# Patient Record
Sex: FEMALE | Race: WHITE | NOT HISPANIC OR LATINO | Employment: FULL TIME | URBAN - METROPOLITAN AREA
[De-identification: names, ages, dates, MRNs, and addresses within clinical notes are randomized per-mention and may not be internally consistent; named-entity substitution may affect disease eponyms.]

---

## 2017-01-09 DIAGNOSIS — G43.719 INTRACTABLE CHRONIC MIGRAINE WITHOUT AURA AND WITHOUT STATUS MIGRAINOSUS: ICD-10-CM

## 2017-01-09 DIAGNOSIS — F43.22 ADJUSTMENT DISORDER WITH ANXIOUS MOOD: Primary | ICD-10-CM

## 2017-01-09 RX ORDER — SUMATRIPTAN 50 MG/1
50 TABLET, FILM COATED ORAL
Qty: 20 TABLET | Refills: 1 | Status: SHIPPED | OUTPATIENT
Start: 2017-01-09 | End: 2017-04-17

## 2017-01-09 RX ORDER — ESCITALOPRAM OXALATE 20 MG/1
20 TABLET ORAL DAILY
Qty: 30 TABLET | Refills: 1 | Status: SHIPPED | OUTPATIENT
Start: 2017-01-09 | End: 2017-01-23

## 2017-01-09 NOTE — TELEPHONE ENCOUNTER
Date of last visit at clinic: 12/7/16    Please complete refill and CLOSE ENCOUNTER.  Closing the encounter signifies the refill is complete.

## 2017-01-16 RX ORDER — SUMATRIPTAN 6 MG/.5ML
6 INJECTION, SOLUTION SUBCUTANEOUS ONCE
Qty: 0.5 ML | Refills: 3 | OUTPATIENT
Start: 2017-01-16 | End: 2017-01-23

## 2017-01-23 DIAGNOSIS — G43.719 INTRACTABLE CHRONIC MIGRAINE WITHOUT AURA AND WITHOUT STATUS MIGRAINOSUS: ICD-10-CM

## 2017-01-23 DIAGNOSIS — F43.22 ADJUSTMENT DISORDER WITH ANXIOUS MOOD: Primary | ICD-10-CM

## 2017-01-23 DIAGNOSIS — G43.009 NONINTRACTABLE MIGRAINE, UNSPECIFIED MIGRAINE TYPE: ICD-10-CM

## 2017-01-23 RX ORDER — SUMATRIPTAN 6 MG/.5ML
6 INJECTION, SOLUTION SUBCUTANEOUS ONCE
Qty: 0.5 ML | Refills: 3 | Status: SHIPPED | OUTPATIENT
Start: 2017-01-23 | End: 2017-01-23

## 2017-01-23 RX ORDER — AMITRIPTYLINE HYDROCHLORIDE 10 MG/1
20 TABLET ORAL AT BEDTIME
Qty: 180 TABLET | Refills: 1 | Status: SHIPPED | OUTPATIENT
Start: 2017-01-23 | End: 2017-06-12

## 2017-01-23 RX ORDER — ESCITALOPRAM OXALATE 20 MG/1
20 TABLET ORAL DAILY
Qty: 90 TABLET | Refills: 1 | Status: SHIPPED | OUTPATIENT
Start: 2017-01-23 | End: 2017-08-09

## 2017-04-17 DIAGNOSIS — G43.719 INTRACTABLE CHRONIC MIGRAINE WITHOUT AURA AND WITHOUT STATUS MIGRAINOSUS: ICD-10-CM

## 2017-04-17 RX ORDER — SUMATRIPTAN 50 MG/1
50 TABLET, FILM COATED ORAL
Qty: 20 TABLET | Refills: 1 | Status: SHIPPED | OUTPATIENT
Start: 2017-04-17 | End: 2017-10-02

## 2017-04-17 NOTE — TELEPHONE ENCOUNTER
Request for medication refill:    Date of last visit at clinic: 12/6/16    Please complete refill if appropriate and CLOSE ENCOUNTER.    Closing the encounter signifies the refill is complete.    If refill has been denied, please complete the smart phrase .smirefuse and route it to the [facility] RN TRIAGE pool to inform the patient and the pharmacy.    Afua Dotson

## 2017-06-12 DIAGNOSIS — G43.009 NONINTRACTABLE MIGRAINE, UNSPECIFIED MIGRAINE TYPE: ICD-10-CM

## 2017-06-12 RX ORDER — AMITRIPTYLINE HYDROCHLORIDE 10 MG/1
20 TABLET ORAL AT BEDTIME
Qty: 180 TABLET | Refills: 1 | Status: SHIPPED | OUTPATIENT
Start: 2017-06-12 | End: 2017-12-04

## 2017-06-12 NOTE — TELEPHONE ENCOUNTER
Request for medication refill:    Date of last visit at clinic: 12/7/16    Please complete refill if appropriate and CLOSE ENCOUNTER.    Closing the encounter signifies the refill is complete.    If refill has been denied, please complete the smart phrase .smirefuse and route it to the [facility] RN TRIAGE pool to inform the patient and the pharmacy.    Bhavani Ponce, CMA

## 2017-08-09 DIAGNOSIS — F43.22 ADJUSTMENT DISORDER WITH ANXIOUS MOOD: ICD-10-CM

## 2017-08-09 RX ORDER — ESCITALOPRAM OXALATE 20 MG/1
20 TABLET ORAL DAILY
Qty: 90 TABLET | Refills: 1 | Status: SHIPPED | OUTPATIENT
Start: 2017-08-09 | End: 2018-06-28

## 2017-08-09 NOTE — TELEPHONE ENCOUNTER
Request for medication refill:    Date of last visit at clinic: 12/7/16    Please complete refill if appropriate and CLOSE ENCOUNTER.    Closing the encounter signifies the refill is complete.    If refill has been denied, please complete the smart phrase .smirefuse and route it to the [facility] RN TRIAGE pool to inform the patient and the pharmacy.    Bhavani Ponce, CMA

## 2017-10-02 DIAGNOSIS — G43.719 INTRACTABLE CHRONIC MIGRAINE WITHOUT AURA AND WITHOUT STATUS MIGRAINOSUS: ICD-10-CM

## 2017-10-02 RX ORDER — SUMATRIPTAN 50 MG/1
50 TABLET, FILM COATED ORAL
Qty: 20 TABLET | Refills: 1 | Status: SHIPPED | OUTPATIENT
Start: 2017-10-02 | End: 2018-01-30

## 2017-10-02 NOTE — TELEPHONE ENCOUNTER
Request for medication refill:    Date of last visit at clinic: 12-7-16    Please complete refill if appropriate and CLOSE ENCOUNTER.    Closing the encounter signifies the refill is complete.    If refill has been denied, please complete the smart phrase .smirefuse and route it to the [facility] RN TRIAGE pool to inform the patient and the pharmacy.    Kristy Castillo, CMA

## 2017-12-04 DIAGNOSIS — G43.009 NONINTRACTABLE MIGRAINE, UNSPECIFIED MIGRAINE TYPE: ICD-10-CM

## 2017-12-04 RX ORDER — AMITRIPTYLINE HYDROCHLORIDE 10 MG/1
20 TABLET ORAL AT BEDTIME
Qty: 180 TABLET | Refills: 1 | Status: SHIPPED | OUTPATIENT
Start: 2017-12-04 | End: 2018-06-01

## 2018-01-30 DIAGNOSIS — G43.719 INTRACTABLE CHRONIC MIGRAINE WITHOUT AURA AND WITHOUT STATUS MIGRAINOSUS: ICD-10-CM

## 2018-01-30 RX ORDER — SUMATRIPTAN 50 MG/1
50 TABLET, FILM COATED ORAL
Qty: 20 TABLET | Refills: 1 | Status: SHIPPED | OUTPATIENT
Start: 2018-01-30 | End: 2018-07-18

## 2018-01-30 NOTE — TELEPHONE ENCOUNTER
Request for medication refill:    Date of last visit at clinic: 12/07/2016    Please complete refill if appropriate and CLOSE ENCOUNTER.    Closing the encounter signifies the refill is complete.    If refill has been denied, please complete the smart phrase .smirefuse and route it to the [facility] RN TRIAGE pool to inform the patient and the pharmacy.    Bhavani Ponce CMA

## 2018-06-01 DIAGNOSIS — G43.009 NONINTRACTABLE MIGRAINE, UNSPECIFIED MIGRAINE TYPE: ICD-10-CM

## 2018-06-01 RX ORDER — AMITRIPTYLINE HYDROCHLORIDE 10 MG/1
20 TABLET ORAL AT BEDTIME
Qty: 180 TABLET | Refills: 1 | Status: SHIPPED | OUTPATIENT
Start: 2018-06-01 | End: 2019-03-18

## 2018-06-01 NOTE — TELEPHONE ENCOUNTER
Request for medication refill:    Date of last visit at clinic: 12/7/16    Please complete refill if appropriate and CLOSE ENCOUNTER.    Closing the encounter signifies the refill is complete.    If refill has been denied, please complete the smart phrase .smirefuse and route it to the [facility] RN TRIAGE pool to inform the patient and the pharmacy.    Caden Schultz CMA

## 2018-06-16 ENCOUNTER — TELEPHONE (OUTPATIENT)
Dept: FAMILY MEDICINE | Facility: CLINIC | Age: 43
End: 2018-06-16

## 2018-06-16 DIAGNOSIS — G43.711 INTRACTABLE CHRONIC MIGRAINE WITHOUT AURA AND WITH STATUS MIGRAINOSUS: Primary | ICD-10-CM

## 2018-06-16 RX ORDER — CEFUROXIME AXETIL 250 MG/1
6 TABLET ORAL
Qty: 2 KIT | Refills: 1 | Status: SHIPPED | OUTPATIENT
Start: 2018-06-16 | End: 2018-06-19

## 2018-06-16 NOTE — TELEPHONE ENCOUNTER
PHONE CALL NOTE  I contacted Deisy Bowden regarding Return call back     2018  8:58 AM    Message returned by Sergio Dong    Patient: Deisy Bowden   Phone number-  [phone] (home)       SPOKE TO PATIENT: Yes    Patient has history of migraines, is now living in [place], patient of Dr. Santos.  Takes imitrex tablets for migraines, if not enough she rarely (1 x a year) uses injectable imitrex.  Her last prescription form (17) , wanted new prescription sent to pharmacy in [place].  On chart review she has not seen Dr. Santos for >1 year.  No focal deficits, no vision changes, reports this feels like her usual migraines.    - Refilled Imitrex subq  - Recommended that she should see Dr. Santos regarding her migraines, there had been previous discussions about seeing neurology, elavil and other options.        Discussed with the patient and agreed with the plan     Routed message to PCP     Sergio Dong MD, MPH  PGY-2 [facility]  Pager: [phone]

## 2018-06-19 RX ORDER — CEFUROXIME AXETIL 250 MG/1
6 TABLET ORAL
Qty: 2 KIT | Refills: 1 | Status: SHIPPED | OUTPATIENT
Start: 2018-06-19 | End: 2022-11-15

## 2018-06-28 ENCOUNTER — OFFICE VISIT (OUTPATIENT)
Dept: FAMILY MEDICINE | Facility: CLINIC | Age: 43
End: 2018-06-28
Payer: COMMERCIAL

## 2018-06-28 VITALS
HEIGHT: 67 IN | SYSTOLIC BLOOD PRESSURE: 123 MMHG | HEART RATE: 103 BPM | TEMPERATURE: 98.4 F | RESPIRATION RATE: 16 BRPM | WEIGHT: 130.2 LBS | BODY MASS INDEX: 20.44 KG/M2 | DIASTOLIC BLOOD PRESSURE: 80 MMHG | OXYGEN SATURATION: 96 %

## 2018-06-28 DIAGNOSIS — Z86.32 HISTORY OF GESTATIONAL DIABETES MELLITUS (GDM): ICD-10-CM

## 2018-06-28 DIAGNOSIS — Z00.00 ROUTINE GENERAL MEDICAL EXAMINATION AT A HEALTH CARE FACILITY: Primary | ICD-10-CM

## 2018-06-28 LAB
CHOLEST SERPL-MCNC: 176.6 MG/DL (ref 0–200)
CHOLEST/HDLC SERPL: 3.1 {RATIO} (ref 0–5)
GLUCOSE CASUAL: 94 MG/DL (ref 51–200)
HDLC SERPL-MCNC: 56.3 MG/DL
LDLC SERPL CALC-MCNC: 110 MG/DL (ref 0–129)
TRIGL SERPL-MCNC: 53.5 MG/DL (ref 0–150)
VLDL CHOLESTEROL: 10.7 MG/DL (ref 7–32)

## 2018-06-28 ASSESSMENT — ANXIETY QUESTIONNAIRES
2. NOT BEING ABLE TO STOP OR CONTROL WORRYING: NOT AT ALL
6. BECOMING EASILY ANNOYED OR IRRITABLE: NOT AT ALL
IF YOU CHECKED OFF ANY PROBLEMS ON THIS QUESTIONNAIRE, HOW DIFFICULT HAVE THESE PROBLEMS MADE IT FOR YOU TO DO YOUR WORK, TAKE CARE OF THINGS AT HOME, OR GET ALONG WITH OTHER PEOPLE: NOT DIFFICULT AT ALL
7. FEELING AFRAID AS IF SOMETHING AWFUL MIGHT HAPPEN: NOT AT ALL
GAD7 TOTAL SCORE: 1
5. BEING SO RESTLESS THAT IT IS HARD TO SIT STILL: NOT AT ALL
1. FEELING NERVOUS, ANXIOUS, OR ON EDGE: SEVERAL DAYS
3. WORRYING TOO MUCH ABOUT DIFFERENT THINGS: NOT AT ALL

## 2018-06-28 ASSESSMENT — PATIENT HEALTH QUESTIONNAIRE - PHQ9: 5. POOR APPETITE OR OVEREATING: NOT AT ALL

## 2018-06-28 NOTE — MR AVS SNAPSHOT
After Visit Summary   6/28/2018    Deisy Bowden    MRN: [id number]           Patient Information     Date Of Birth          1975        Visit Information        Provider Department      6/28/2018 9:40 AM Barbara Santos MD [facility]        Today's Diagnoses     Routine general medical examination at a health care facility    -  1    History of gestational diabetes mellitus (GDM)          Care Instructions      Here is the plan from today's visit    1. Routine general medical examination at a health care facility  Vitamin D 1000 a day   Calcium - 4 servings a day  Weight lifting - upper body     2. History of gestational diabetes mellitus (GDM)  - Lipid Cascade ([facility])  - Glucose Casual ([facility])    Skin - can try Nizoral shampoo for your face to see if that helps the redness. If not, could try 1% hydrocortisone.     Seborrheic Keratosis - on your back     [facility] if you want referral on the migraines      Please call or return to clinic if your symptoms don't go away.    Follow up plan  In 1 - 3 years.     Thank you for coming to [facility] today.  Lab Testing:  **If you had lab testing today and your results are reassuring or normal they will be mailed to you or sent through [employer] within 7 days.   **If the lab tests need quick action we will call you with the results.  The phone number we will call with results is # [phone] (home) . If this is not the best number please call our clinic and change the number.  Medication Refills:  If you need any refills please call your pharmacy and they will contact us.   If you need to  your refill at a new pharmacy, please contact the new pharmacy directly. The new pharmacy will help you get your medications transferred faster.   Scheduling:  If you have any concerns about today's visit or wish to schedule another appointment please call our office during normal business hours [phone] (8-5:00 M-F)  If a  referral was made to a [facility] and you don't get a call from central scheduling please call [phone].  If a Mammogram was ordered for you at [facility] call [phone] to schedule or change your appointment.  If you had an XRay/CT/Ultrasound/MRI ordered the number is [phone] to schedule or change your radiology appointment.   Medical Concerns:  If you have urgent medical concerns please call [phone] at any time of the day.  If you have a medical emergency please call 164.      Preventive Health Recommendations  Female Ages 40 to 49    Yearly exam:     See your health care provider every year in order to  1. Review health changes.   2. Discuss preventive care.    3. Review your medicines if your doctor prescribed any.      Get a Pap test every three years (unless you have an abnormal result and your provider advises testing more often).      If you get Pap tests with HPV test, you only need to test every 5 years, unless you have an abnormal result. You do not need a Pap test if your uterus was removed (hysterectomy) and you have not had cancer.      You should be tested each year for STDs (sexually transmitted diseases), if you're at risk.     Ask your doctor if you should have a mammogram.      Have a colonoscopy (test for colon cancer) if someone in your family has had colon cancer or polyps before age 50.       Have a cholesterol test every 5 years.       Have a diabetes test (fasting glucose) after age 45. If you are at risk for diabetes, you should have this test every 3 years.    Shots: Get a flu shot each year. Get a tetanus shot every 10 years.     Nutrition:     Eat at least 5 servings of fruits and vegetables each day.    Eat whole-grain bread, whole-wheat pasta and brown rice instead of white grains and rice.    Get adequate Calcium and Vitamin D.      Lifestyle    Exercise at least 150 minutes a week (an average of 30 minutes a day, 5 days a week). This  "will help you control your weight and prevent disease.    Limit alcohol to one drink per day.    No smoking.     Wear sunscreen to prevent skin cancer.    See your dentist every six months for an exam and cleaning.          Follow-ups after your visit        Who to contact     Please call your clinic at [phone] to:    Ask questions about your health    Make or cancel appointments    Discuss your medicines    Learn about your test results    Speak to your doctor            Additional Information About Your Visit        [employer] Information     [employer] gives you secure access to your electronic health record. If you see a primary care provider, you can also send messages to your care team and make appointments. If you have questions, please call your primary care clinic.  If you do not have a primary care provider, please call [phone] and they will assist you.      [employer] is an electronic gateway that provides easy, online access to your medical records. With [employer], you can request a clinic appointment, read your test results, renew a prescription or communicate with your care team.     To access your existing account, please contact your [facility] or call [phone] for assistance.        Care EveryWhere ID     This is your Care EveryWhere ID. This could be used by other organizations to access your [place] medical records  [id number]        Your Vitals Were     Pulse Temperature Respirations Height Pulse Oximetry Breastfeeding?    103 98.4  F (36.9  C) (Oral) 16 5' 7\" (170.2 cm) 96% No    BMI (Body Mass Index)                   20.39 kg/m2            Blood Pressure from Last 3 Encounters:   06/28/18 123/80   12/07/16 128/80   11/01/16 99/70    Weight from Last 3 Encounters:   06/28/18 130 lb 3.2 oz (59.1 kg)   12/07/16 127 lb 3.2 oz (57.7 kg)   11/01/16 128 lb 6.4 oz (58.2 kg)              We Performed the Following     Glucose Casual ([facility])     Lipid Cascade " ([facility])          Today's Medication Changes          These changes are accurate as of 6/28/18 10:51 AM.  If you have any questions, ask your nurse or doctor.               Stop taking these medicines if you haven't already. Please contact your care team if you have questions.     B-2-400 400 MG Caps   Generic drug:  riboflavin   Stopped by:  Barbara Santos MD           escitalopram 20 MG tablet   Commonly known as:  LEXAPRO   Stopped by:  Barbara Santos MD           ranitidine 75 MG tablet   Generic drug:  ranitidine   Stopped by:  Barbara Santos MD           senna-docusate 8.6-50 MG per tablet   Commonly known as:  SENOKOT-S;PERICOLACE   Stopped by:  Barbara Santos MD                    Primary Care Provider Office Phone # Fax #    Barbara Santos [phone]       [address] [address]        Equal Access to Services     [facility]: Hadii kirti jarquin hadasho Sobobby, waaxda luqadaha, qaybta kaalmada adeegyada, ella elizabeth . So [facility] [phone].    ATENCIÓN: Si habla español, tiene a strickland disposición servicios gratuitos de asistencia lingüística. Sara al [phone].    We comply with applicable federal civil rights laws and [place] laws. We do not discriminate on the basis of race, color, national origin, age, disability, sex, sexual orientation, or gender identity.            Thank you!     Thank you for choosing [facility]  for your care. Our goal is always to provide you with excellent care. Hearing back from our patients is one way we can continue to improve our services. Please take a few minutes to complete the written survey that you may receive in the mail after your visit with us. Thank you!             Your Updated Medication List - Protect others around you: Learn how to safely use, store and throw away your medicines at www.disposemymeds.org.          This list is accurate as of 6/28/18 10:51 AM.   Always use your most recent med list.                   Brand Name Dispense Instructions for use Diagnosis    ACETAMINOPHEN PO      Take 1 tablet by mouth as needed        amitriptyline 10 MG tablet    ELAVIL    180 tablet    Take 2 tablets (20 mg) by mouth At Bedtime    Nonintractable migraine, unspecified migraine type       magnesium oxide 400 (241.3 Mg) MG tablet    MAG-OX     Take 1 tablet (400 mg) by mouth daily    Nonintractable migraine, unspecified migraine type       * SUMAtriptan 50 MG tablet    IMITREX    20 tablet    Take 1 tablet (50 mg) by mouth at onset of headache for migraine May repeat dose in 2 hours.  Do not exceed 200 mg in 24 hours    Intractable chronic migraine without aura and without status migrainosus       * SUMAtriptan 6 MG/0.5ML pen injector kit    IMITREX STATDOSE    2 kit    Inject 0.5 mLs (6 mg) Subcutaneous at onset of headache for migraine May repeat in 1 hour. Max 12 mg/24 hours.    Intractable chronic migraine without aura and with status migrainosus       * Notice:  This list has 2 medication(s) that are the same as other medications prescribed for you. Read the directions carefully, and ask your doctor or other care provider to review them with you.

## 2018-06-28 NOTE — PATIENT INSTRUCTIONS
Here is the plan from today's visit    1. Routine general medical examination at a health care facility  Vitamin D 1000 a day   Calcium - 4 servings a day  Weight lifting - upper body     2. History of gestational diabetes mellitus (GDM)  - Lipid Cascade ([facility])  - Glucose Casual ([facility])    Skin - can try Nizoral shampoo for your face to see if that helps the redness. If not, could try 1% hydrocortisone.     Seborrheic Keratosis - on your back     Holler if you want referral on the migraines      Please call or return to clinic if your symptoms don't go away.    Follow up plan  In 1 - 3 years.     Thank you for coming to [facility] today.  Lab Testing:  **If you had lab testing today and your results are reassuring or normal they will be mailed to you or sent through emaze within 7 days.   **If the lab tests need quick action we will call you with the results.  The phone number we will call with results is # [phone] (home) . If this is not the best number please call our clinic and change the number.  Medication Refills:  If you need any refills please call your pharmacy and they will contact us.   If you need to  your refill at a new pharmacy, please contact the new pharmacy directly. The new pharmacy will help you get your medications transferred faster.   Scheduling:  If you have any concerns about today's visit or wish to schedule another appointment please call our office during normal business hours [phone] (8-5:00 M-F)  If a referral was made to a [facility] and you don't get a call from central scheduling please call [phone].  If a Mammogram was ordered for you at [facility] call [phone] to schedule or change your appointment.  If you had an XRay/CT/Ultrasound/MRI ordered the number is [phone] to schedule or change your radiology appointment.   Medical Concerns:  If you have urgent medical concerns please call [phone] at any  time of the day.  If you have a medical emergency please call 911.      Preventive Health Recommendations  Female Ages 40 to 49    Yearly exam:     See your health care provider every year in order to  1. Review health changes.   2. Discuss preventive care.    3. Review your medicines if your doctor prescribed any.      Get a Pap test every three years (unless you have an abnormal result and your provider advises testing more often).      If you get Pap tests with HPV test, you only need to test every 5 years, unless you have an abnormal result. You do not need a Pap test if your uterus was removed (hysterectomy) and you have not had cancer.      You should be tested each year for STDs (sexually transmitted diseases), if you're at risk.     Ask your doctor if you should have a mammogram.      Have a colonoscopy (test for colon cancer) if someone in your family has had colon cancer or polyps before age 50.       Have a cholesterol test every 5 years.       Have a diabetes test (fasting glucose) after age 45. If you are at risk for diabetes, you should have this test every 3 years.    Shots: Get a flu shot each year. Get a tetanus shot every 10 years.     Nutrition:     Eat at least 5 servings of fruits and vegetables each day.    Eat whole-grain bread, whole-wheat pasta and brown rice instead of white grains and rice.    Get adequate Calcium and Vitamin D.      Lifestyle    Exercise at least 150 minutes a week (an average of 30 minutes a day, 5 days a week). This will help you control your weight and prevent disease.    Limit alcohol to one drink per day.    No smoking.     Wear sunscreen to prevent skin cancer.    See your dentist every six months for an exam and cleaning.

## 2018-06-28 NOTE — PROGRESS NOTES
Female Physical Note          HPI         Concerns today: No special concerns today.    Is doing really well.   Starting to take care of herself, sleeping, not taking care of all.   Still at the same job and is doing things differently there. Works from home.     Migraines - 6 - 8 per month. Takes the tablet for each one, at times takes two.  Completely hormonal.  Used to get them when stressed (randomly) but does get them when she ovulates and before and during her period. These without aura.     Elavil 20 mg at bedtime and helps her sleep. Also melatonin.     Not on Lexapro for a year.   Will also take the Excedrin migraine that can help. Will take it more when has migraines in a row.   Also careful to not schedule things during her period.       Patient Active Problem List   Diagnosis     Health Care Home     Migraine     History of  section     Family history of first degree relative with congenital heart disease     Tubal Ligation Consent Signed 4/15/14       Past Medical History:   Diagnosis Date     Abnormal Pap smear      Currently pregnant 15     Diabetes mellitus (H)      Menarche age 18       Previous Medical Care        Family History   Problem Relation Age of Onset     Cancer Maternal Grandmother 80     breast     Diabetes No family hx of      Cerebrovascular Disease Paternal Grandmother      Breast Cancer Maternal Grandmother               Review of Systems:     Review of Systems:  CONSTITUTIONAL: NEGATIVE for fever, chills, change in weight  INTEGUMENTARY/SKIN: NEGATIVE for worrisome rashes, moles or lesions  EYES: NEGATIVE for vision changes or irritation  ENT/MOUTH: NEGATIVE for ear, mouth and throat problems  RESP: NEGATIVE for significant cough or SOB  BREAST: NEGATIVE for masses, tenderness or discharge  CV: NEGATIVE for chest pain, palpitations or peripheral edema  GI: NEGATIVE for nausea, abdominal pain, heartburn, or change in bowel habits  : NEGATIVE for frequency, dysuria,  "or hematuria  MUSCULOSKELETAL: NEGATIVE for significant arthralgias or myalgia  NEURO: NEGATIVE for weakness, dizziness or paresthesias  ENDOCRINE: NEGATIVE for temperature intolerance, skin/hair changes  HEME/ALLERGY: NEGATIVE for bleeding problems  PSYCHIATRIC: NEGATIVE for changes in mood or affect  Sleep:   Do you snore most or the night (as reported by a family member)? No  Do you feel sleepy or extremely tired during most of the day? No             Social History     Social History     Social History     Marital status:      Spouse name: Peder     Number of children: 3     Years of education: N/A     Occupational History           Social History Main Topics     Smoking status: Never Smoker     Smokeless tobacco: Never Used     Alcohol use No     Drug use: No     Sexual activity: Yes     Partners: Male     Other Topics Concern     Not on file     Social History Narrative       Marital Status:  Who lives in your household? Myself,  and three children    Has anyone hurt you physically, for example by pushing, hitting, slapping or kicking you or forcing you to have sex? Denies  Do you feel threatened or controlled by a partner, ex-partner or anyone in your life? Denies    Sexual Health     Sexual concerns: No   STI History: Neg  Pregnancy History:   LMP No LMP recorded.   Last Pap Smear Date:   Lab Results   Component Value Date    PAP NIL 2016    PAP NIL 2011    PAP NIL 2009     Abnormal Pap History: Details:     Recommended Screening     Pap/HPV cotest every 5 years for women 30-65   Testing not indicated   Breast CA Screening (>41 yo or 10 y before 1st degree relative diagnosis):         Physical Exam:     Vitals: /80  Pulse 103  Temp 98.4  F (36.9  C) (Oral)  Resp 16  Ht 5' 7\" (170.2 cm)  Wt 130 lb 3.2 oz (59.1 kg)  SpO2 96%  Breastfeeding? No  BMI 20.39 kg/m2  BMI= Body mass index is 20.39 kg/(m^2).   GENERAL: healthy, alert and no " distress  EYES: Eyes grossly normal to inspection, extraocular movements - intact, and PERRL  HENT: ear canals- normal; TMs- normal; Nose- normal; Mouth- no ulcers, no lesions  NECK: no tenderness, no adenopathy, no asymmetry, no masses, no stiffness; thyroid- normal to palpation  RESP: lungs clear to auscultation - no rales, no rhonchi, no wheezes  BREAST: no masses, no tenderness, no nipple discharge, no palpable axillary masses or adenopathy - fibrocystic breasts bilaterally laterally   CV: regular rates and rhythm, normal S1 S2, no S3 or S4 and no murmur, no click or rub -  ABDOMEN: soft, no tenderness, no  hepatosplenomegaly, no masses, normal bowel sounds  MS: extremities- no gross deformities noted, no edema  SKIN: no suspicious lesions, no rashes  NEURO: strength and tone- normal, sensory exam- grossly normal, mentation- intact, speech- normal  PSYCH: Alert and oriented times 3; speech- coherent , normal rate and volume; able to articulate logical thoughts, able to abstract reason, no tangential thoughts, no hallucinations or delusions, affect- normal  LYMPHATICS: ant. cervical- normal, post. cervical- normal, axillary- normal, supraclavicular- normal, inguinal- normal      Assessment and Plan      Deisy was seen today for physical.    Diagnoses and all orders for this visit:    Routine general medical examination at a health care facility - doing much better    History of gestational diabetes mellitus (GDM)  -     Lipid Cascade ([facility])  -     Glucose Casual ([facility])    Migraines - reviewed options and she would like to keep things the same. Will possibly contact [facility] and work with them if wants to change things.     Options for treatment and follow-up care were reviewed with the patient . Deisy Bowden and/or guardian engaged in the decision making process and verbalized understanding of the options discussed and agreed with the final plan.    CANDI Albert MD

## 2018-06-29 ASSESSMENT — ANXIETY QUESTIONNAIRES: GAD7 TOTAL SCORE: 1

## 2018-06-29 ASSESSMENT — PATIENT HEALTH QUESTIONNAIRE - PHQ9: SUM OF ALL RESPONSES TO PHQ QUESTIONS 1-9: 0

## 2018-07-18 DIAGNOSIS — G43.719 INTRACTABLE CHRONIC MIGRAINE WITHOUT AURA AND WITHOUT STATUS MIGRAINOSUS: ICD-10-CM

## 2018-07-18 RX ORDER — SUMATRIPTAN 50 MG/1
50 TABLET, FILM COATED ORAL
Qty: 20 TABLET | Refills: 1 | Status: SHIPPED | OUTPATIENT
Start: 2018-07-18 | End: 2022-11-15

## 2018-07-18 NOTE — TELEPHONE ENCOUNTER

## 2019-03-18 ENCOUNTER — HOSPITAL ENCOUNTER (EMERGENCY)
Facility: CLINIC | Age: 44
Discharge: HOME | End: 2019-03-18
Attending: EMERGENCY MEDICINE | Admitting: EMERGENCY MEDICINE
Payer: COMMERCIAL

## 2019-03-18 VITALS
BODY MASS INDEX: 18.79 KG/M2 | TEMPERATURE: 98.1 F | HEART RATE: 67 BPM | RESPIRATION RATE: 16 BRPM | WEIGHT: 120 LBS | DIASTOLIC BLOOD PRESSURE: 69 MMHG | OXYGEN SATURATION: 100 % | SYSTOLIC BLOOD PRESSURE: 93 MMHG

## 2019-03-18 DIAGNOSIS — J02.9 ACUTE PHARYNGITIS, UNSPECIFIED ETIOLOGY: ICD-10-CM

## 2019-03-18 DIAGNOSIS — E86.0 DEHYDRATION: ICD-10-CM

## 2019-03-18 LAB
ALBUMIN UR-MCNC: 100 MG/DL
ANION GAP SERPL CALCULATED.3IONS-SCNC: 15 MMOL/L (ref 3–14)
APPEARANCE UR: CLEAR
BASOPHILS # BLD AUTO: 0 10E9/L (ref 0–0.2)
BASOPHILS NFR BLD AUTO: 0.4 %
BILIRUB UR QL STRIP: NEGATIVE
BUN SERPL-MCNC: 7 MG/DL (ref 7–30)
CALCIUM SERPL-MCNC: 8.6 MG/DL (ref 8.5–10.1)
CHLORIDE SERPL-SCNC: 97 MMOL/L (ref 94–109)
CO2 SERPL-SCNC: 22 MMOL/L (ref 20–32)
COLOR UR AUTO: YELLOW
CREAT SERPL-MCNC: 0.63 MG/DL (ref 0.52–1.04)
DEPRECATED S PYO AG THROAT QL EIA: NORMAL
DIFFERENTIAL METHOD BLD: ABNORMAL
EOSINOPHIL # BLD AUTO: 0 10E9/L (ref 0–0.7)
EOSINOPHIL NFR BLD AUTO: 0 %
ERYTHROCYTE [DISTWIDTH] IN BLOOD BY AUTOMATED COUNT: 13.4 % (ref 10–15)
FLUAV+FLUBV AG SPEC QL: NEGATIVE
FLUAV+FLUBV AG SPEC QL: NEGATIVE
GFR SERPL CREATININE-BSD FRML MDRD: >90 ML/MIN/{1.73_M2}
GLUCOSE SERPL-MCNC: 94 MG/DL (ref 70–99)
GLUCOSE UR STRIP-MCNC: NEGATIVE MG/DL
HCG UR QL: NEGATIVE
HCT VFR BLD AUTO: 39.3 % (ref 35–47)
HGB BLD-MCNC: 13.3 G/DL (ref 11.7–15.7)
HGB UR QL STRIP: ABNORMAL
HYALINE CASTS #/AREA URNS LPF: 1 /LPF (ref 0–2)
IMM GRANULOCYTES # BLD: 0 10E9/L (ref 0–0.4)
IMM GRANULOCYTES NFR BLD: 0.2 %
INTERNAL QC OK POCT: YES
KETONES UR STRIP-MCNC: >150 MG/DL
LEUKOCYTE ESTERASE UR QL STRIP: NEGATIVE
LYMPHOCYTES # BLD AUTO: 0.3 10E9/L (ref 0.8–5.3)
LYMPHOCYTES NFR BLD AUTO: 6.7 %
MCH RBC QN AUTO: 31.9 PG (ref 26.5–33)
MCHC RBC AUTO-ENTMCNC: 33.8 G/DL (ref 31.5–36.5)
MCV RBC AUTO: 94 FL (ref 78–100)
MONOCYTES # BLD AUTO: 0.4 10E9/L (ref 0–1.3)
MONOCYTES NFR BLD AUTO: 8.2 %
MUCOUS THREADS #/AREA URNS LPF: PRESENT /LPF
NEUTROPHILS # BLD AUTO: 4.1 10E9/L (ref 1.6–8.3)
NEUTROPHILS NFR BLD AUTO: 84.5 %
NITRATE UR QL: NEGATIVE
NRBC # BLD AUTO: 0 10*3/UL
NRBC BLD AUTO-RTO: 0 /100
PH UR STRIP: 5.5 PH (ref 5–7)
PLATELET # BLD AUTO: 239 10E9/L (ref 150–450)
POTASSIUM SERPL-SCNC: 3.2 MMOL/L (ref 3.4–5.3)
RBC # BLD AUTO: 4.17 10E12/L (ref 3.8–5.2)
RBC #/AREA URNS AUTO: 2 /HPF (ref 0–2)
SODIUM SERPL-SCNC: 134 MMOL/L (ref 133–144)
SOURCE: ABNORMAL
SP GR UR STRIP: 1.02 (ref 1–1.03)
SPECIMEN SOURCE: NORMAL
SPECIMEN SOURCE: NORMAL
SQUAMOUS #/AREA URNS AUTO: <1 /HPF (ref 0–1)
UROBILINOGEN UR STRIP-MCNC: NORMAL MG/DL (ref 0–2)
WBC # BLD AUTO: 4.9 10E9/L (ref 4–11)
WBC #/AREA URNS AUTO: <1 /HPF (ref 0–5)

## 2019-03-18 PROCEDURE — 87804 INFLUENZA ASSAY W/OPTIC: CPT | Mod: 91 | Performed by: EMERGENCY MEDICINE

## 2019-03-18 PROCEDURE — 80048 BASIC METABOLIC PNL TOTAL CA: CPT | Performed by: EMERGENCY MEDICINE

## 2019-03-18 PROCEDURE — 99283 EMERGENCY DEPT VISIT LOW MDM: CPT | Mod: 25 | Performed by: EMERGENCY MEDICINE

## 2019-03-18 PROCEDURE — 25800030 ZZH RX IP 258 OP 636

## 2019-03-18 PROCEDURE — 81025 URINE PREGNANCY TEST: CPT | Performed by: EMERGENCY MEDICINE

## 2019-03-18 PROCEDURE — 87880 STREP A ASSAY W/OPTIC: CPT | Performed by: EMERGENCY MEDICINE

## 2019-03-18 PROCEDURE — 99284 EMERGENCY DEPT VISIT MOD MDM: CPT | Mod: Z6 | Performed by: EMERGENCY MEDICINE

## 2019-03-18 PROCEDURE — 96360 HYDRATION IV INFUSION INIT: CPT | Performed by: EMERGENCY MEDICINE

## 2019-03-18 PROCEDURE — 85025 COMPLETE CBC W/AUTO DIFF WBC: CPT | Performed by: EMERGENCY MEDICINE

## 2019-03-18 PROCEDURE — 25000132 ZZH RX MED GY IP 250 OP 250 PS 637: Performed by: EMERGENCY MEDICINE

## 2019-03-18 PROCEDURE — 81001 URINALYSIS AUTO W/SCOPE: CPT | Performed by: EMERGENCY MEDICINE

## 2019-03-18 PROCEDURE — 87081 CULTURE SCREEN ONLY: CPT | Performed by: EMERGENCY MEDICINE

## 2019-03-18 RX ORDER — SODIUM CHLORIDE 9 MG/ML
INJECTION, SOLUTION INTRAVENOUS
Status: COMPLETED
Start: 2019-03-18 | End: 2019-03-18

## 2019-03-18 RX ORDER — ACETAMINOPHEN 500 MG
1000 TABLET ORAL ONCE
Status: COMPLETED | OUTPATIENT
Start: 2019-03-18 | End: 2019-03-18

## 2019-03-18 RX ADMIN — ACETAMINOPHEN 1000 MG: 500 TABLET ORAL at 10:49

## 2019-03-18 RX ADMIN — Medication 1000 ML: at 12:16

## 2019-03-18 RX ADMIN — SODIUM CHLORIDE 1000 ML: 9 INJECTION, SOLUTION INTRAVENOUS at 12:16

## 2019-03-18 ASSESSMENT — ENCOUNTER SYMPTOMS: DYSURIA: 0

## 2019-03-18 NOTE — LETTER
March 18, 2019      To Whom It May Concern:      Deisy Bowden was seen in our Emergency Department today, 03/18/19.  I expect her condition to improve over the next few days.  She may not return to work/school until 3/20/19.    Sincerely,        Christiano Mayer MD, MD

## 2019-03-18 NOTE — ED AVS SNAPSHOT
[facility], Emergency Department  [address]  [facility] [place] [address]  Phone:  [phone]  Fax:  [phone]                                    Deisy Bowden   MRN: [id number]    Department:  [facility], Emergency Department   Date of Visit:  3/18/2019           After Visit Summary Signature Page    I have received my discharge instructions, and my questions have been answered. I have discussed any challenges I see with this plan with the nurse or doctor.    ..........................................................................................................................................  Patient/Patient Representative Signature      ..........................................................................................................................................  Patient Representative Print Name and Relationship to Patient    ..................................................               ................................................  Date                                   Time    ..........................................................................................................................................  Reviewed by Signature/Title    ...................................................              ..............................................  Date                                               Time          22EPIC Rev 08/18

## 2019-03-18 NOTE — DISCHARGE INSTRUCTIONS
Keep hydrated.    Please make an appointment to follow up with Your Primary Care Provider in 2-3 days if not improving.    Return to the ER for worsening.

## 2019-03-18 NOTE — ED PROVIDER NOTES
History     Chief Complaint   Patient presents with     Abdominal Pain     Lower abdominal pain with lower back pain since last night.      Pharyngitis     Sore throat with a fever (100.5) and cough since yesterday.     HPI  Deisy Bowden is a 43 year old female who presents the Emergency Department with an illness this been ongoing for the last 2 days.  Patient states that she developed a sore throat 2 days ago associated with fevers and chills.  Patient states that she has had some slight sinus congestion that developed following day and minimal coughing.  Patient states that she also developed some low back pain with pain radiating around to her abdomen.  Patient states that she has no UTI symptoms, no vomiting, no diarrhea and states that she had a bowel movement in the last 24 hours and states that she has had no melena or bright blood per rectum.  Patient states she has had no abdominal surgery other than being status post tubal ligation in 2014.    This part of the medical record was transcribed by Simón Lazo Medical Scribe, from a dictation done by Christiano Mayer MD.       I have reviewed the Medications, Allergies, Past Medical and Surgical History, and Social History in the [employer] system.    Past Medical History:   Diagnosis Date     Abnormal Pap smear      Currently pregnant 09/26/13     Diabetes mellitus (H)      Menarche age 18       No current facility-administered medications on file prior to encounter.   Current Outpatient Medications on File Prior to Encounter:  ACETAMINOPHEN PO Take 1 tablet by mouth as needed   IBUPROFEN PO    Pseudoephedrine HCl (SUDAFED PO)    magnesium oxide (MAG-OX) 400 (241.3 MG) MG tablet Take 1 tablet (400 mg) by mouth daily   SUMAtriptan (IMITREX STATDOSE) 6 MG/0.5ML pen injector kit Inject 0.5 mLs (6 mg) Subcutaneous at onset of headache for migraine May repeat in 1 hour. Max 12 mg/24 hours.   SUMAtriptan (IMITREX) 50 MG tablet Take 1 tablet (50 mg) by mouth at  onset of headache for migraine May repeat dose in 2 hours.  Do not exceed 200 mg in 24 hours     Allergies   Allergen Reactions     Amoxicillin Rash     Diffuse maculopapular rash     Iodide Rash     Skin rash after  prep.     Social History     Socioeconomic History     Marital status:      Spouse name: Peder     Number of children: 3     Years of education: Not on file     Highest education level: Not on file   Occupational History     Occupation:    Social Needs     Financial resource strain: Not on file     Food insecurity:     Worry: Not on file     Inability: Not on file     Transportation needs:     Medical: Not on file     Non-medical: Not on file   Tobacco Use     Smoking status: Never Smoker     Smokeless tobacco: Never Used   Substance and Sexual Activity     Alcohol use: No     Drug use: No     Sexual activity: Yes     Partners: Male   Lifestyle     Physical activity:     Days per week: Not on file     Minutes per session: Not on file     Stress: Not on file   Relationships     Social connections:     Talks on phone: Not on file     Gets together: Not on file     Attends Quaker service: Not on file     Active member of club or organization: Not on file     Attends meetings of clubs or organizations: Not on file     Relationship status: Not on file     Intimate partner violence:     Fear of current or ex partner: Not on file     Emotionally abused: Not on file     Physically abused: Not on file     Forced sexual activity: Not on file   Other Topics Concern     Parent/sibling w/ CABG, MI or angioplasty before 65F 55M? Not Asked   Social History Narrative     Not on file     Past Surgical History:   Procedure Laterality Date      SECTION  09      SECTION  10/1/2011    Procedure: SECTION; Surgeon:SANTOSH NOWAK; Location:[facility] L+D      SECTION, TUBAL LIGATION, COMBINED  2014    Procedure: COMBINED  SECTION, TUBAL LIGATION;  Surgeon:  Chelsi Danielle MD;  Location:  L+D     Family History   Problem Relation Age of Onset     Cancer Maternal Grandmother 80        breast     Breast Cancer Maternal Grandmother      Cerebrovascular Disease Paternal Grandmother      Diabetes No family hx of        Review of Systems   Genitourinary: Negative for dysuria and vaginal discharge.   All other systems reviewed and are negative.      Physical Exam   BP: 99/76  Pulse: 71  Temp: 98  F (36.7  C)  Resp: 16  Weight: 54.4 kg (120 lb)  SpO2: 99 %      Physical Exam   Constitutional: She is oriented to person, place, and time.   Awake alert conversant and moves about without difficulty   HENT:   Head: Atraumatic.   Mouth/Throat: Oropharyngeal exudate present.   TMs clear   Eyes: EOM are normal. Pupils are equal, round, and reactive to light.   Neck: Neck supple.   Cardiovascular: Regular rhythm.   Pulmonary/Chest: Breath sounds normal. She has no wheezes. She has no rales.   Abdominal: Soft. There is no tenderness.   Musculoskeletal: She exhibits no edema or deformity.   No midline spinal tenderness    No CVA tenderness.   Lymphadenopathy:     She has cervical adenopathy.   Neurological: She is alert and oriented to person, place, and time.   Skin: Skin is warm and dry.   Psychiatric: She has a normal mood and affect.   Nursing note and vitals reviewed.      ED Course        Procedures            Results for orders placed or performed during the hospital encounter of 03/18/19   UA with Microscopic reflex to Culture   Result Value Ref Range    Color Urine Yellow     Appearance Urine Clear     Glucose Urine Negative NEG^Negative mg/dL    Bilirubin Urine Negative NEG^Negative    Ketones Urine >150 (A) NEG^Negative mg/dL    Specific Gravity Urine 1.020 1.003 - 1.035    Blood Urine Small (A) NEG^Negative    pH Urine 5.5 5.0 - 7.0 pH    Protein Albumin Urine 100 (A) NEG^Negative mg/dL    Urobilinogen mg/dL Normal 0.0 - 2.0 mg/dL    Nitrite Urine Negative  NEG^Negative    Leukocyte Esterase Urine Negative NEG^Negative    Source Midstream Urine     WBC Urine <1 0 - 5 /HPF    RBC Urine 2 0 - 2 /HPF    Squamous Epithelial /HPF Urine <1 0 - 1 /HPF    Mucous Urine Present (A) NEG^Negative /LPF    Hyaline Casts 1 0 - 2 /LPF   CBC with platelets differential   Result Value Ref Range    WBC 4.9 4.0 - 11.0 10e9/L    RBC Count 4.17 3.8 - 5.2 10e12/L    Hemoglobin 13.3 11.7 - 15.7 g/dL    Hematocrit 39.3 35.0 - 47.0 %    MCV 94 78 - 100 fl    MCH 31.9 26.5 - 33.0 pg    MCHC 33.8 31.5 - 36.5 g/dL    RDW 13.4 10.0 - 15.0 %    Platelet Count 239 150 - 450 10e9/L    Diff Method Automated Method     % Neutrophils 84.5 %    % Lymphocytes 6.7 %    % Monocytes 8.2 %    % Eosinophils 0.0 %    % Basophils 0.4 %    % Immature Granulocytes 0.2 %    Nucleated RBCs 0 0 /100    Absolute Neutrophil 4.1 1.6 - 8.3 10e9/L    Absolute Lymphocytes 0.3 (L) 0.8 - 5.3 10e9/L    Absolute Monocytes 0.4 0.0 - 1.3 10e9/L    Absolute Eosinophils 0.0 0.0 - 0.7 10e9/L    Absolute Basophils 0.0 0.0 - 0.2 10e9/L    Abs Immature Granulocytes 0.0 0 - 0.4 10e9/L    Absolute Nucleated RBC 0.0    Basic metabolic panel   Result Value Ref Range    Sodium 134 133 - 144 mmol/L    Potassium 3.2 (L) 3.4 - 5.3 mmol/L    Chloride 97 94 - 109 mmol/L    Carbon Dioxide 22 20 - 32 mmol/L    Anion Gap 15 (H) 3 - 14 mmol/L    Glucose 94 70 - 99 mg/dL    Urea Nitrogen 7 7 - 30 mg/dL    Creatinine 0.63 0.52 - 1.04 mg/dL    GFR Estimate >90 >60 mL/min/[1.73_m2]    GFR Estimate If Black >90 >60 mL/min/[1.73_m2]    Calcium 8.6 8.5 - 10.1 mg/dL   hCG qual urine POCT   Result Value Ref Range    HCG Qual Urine Negative neg    Internal QC OK Yes    Rapid strep screen   Result Value Ref Range    Specimen Description Throat     Rapid Strep A Screen       NEGATIVE: No Group A streptococcal antigen detected by immunoassay, await culture report.   Influenza A/B antigen   Result Value Ref Range    Influenza A/B Agn Specimen Nasopharyngeal      Influenza A Negative NEG^Negative    Influenza B Negative NEG^Negative       Labs Ordered and Resulted from Time of ED Arrival Up to the Time of Departure from the ED   ROUTINE UA WITH MICROSCOPIC REFLEX TO CULTURE - Abnormal; Notable for the following components:       Result Value    Ketones Urine >150 (*)     Blood Urine Small (*)     Protein Albumin Urine 100 (*)     Mucous Urine Present (*)     All other components within normal limits   CBC WITH PLATELETS DIFFERENTIAL - Abnormal; Notable for the following components:    Absolute Lymphocytes 0.3 (*)     All other components within normal limits   BASIC METABOLIC PANEL - Abnormal; Notable for the following components:    Potassium 3.2 (*)     Anion Gap 15 (*)     All other components within normal limits   HCG QUAL URINE POCT - Normal   PERIPHERAL IV CATHETER   RAPID STREP SCREEN   INFLUENZA A/B ANTIGEN   BETA STREP GROUP A CULTURE       Assessments & Plan (with Medical Decision Making)     I have reviewed the nursing notes.    Medications   sodium chloride (PF) 0.9% PF flush 3 mL (not administered)   sodium chloride (PF) 0.9% PF flush 3 mL (not administered)   0.9% sodium chloride BOLUS (1,000 mLs Intravenous New Bag 3/18/19 1216)   acetaminophen (TYLENOL) tablet 1,000 mg (1,000 mg Oral Given 3/18/19 1049)     Patient was found to be significantly dehydrated and therefore received 1 L of IV fluids.  She also received some Tylenol and felt better.  At this time it appears her illness is mostly viral in etiology.  I am not concerned about abdominal pain complaints as her abdomen is soft and benign throughout.  I am fairly certain this is all part of her viral illness but informed the patient that should her abdominal pain get worse it will need to be rechecked.    I have reviewed the findings, diagnosis, plan and need for follow up with the patient.    Final diagnoses:   Acute pharyngitis, unspecified etiology   Dehydration     Keep hydrated.    Please make an  appointment to follow up with Your Primary Care Provider in 2-3 days if not improving.    Return to the ER for worsening.    Work note given    Routine discharge instructions for these diagnoses given    Christiano Mayer MD    3/18/2019   [facility], [place], EMERGENCY DEPARTMENT     Christiano Mayer MD  03/18/19 2323

## 2019-03-19 ENCOUNTER — TELEPHONE (OUTPATIENT)
Dept: FAMILY MEDICINE | Facility: CLINIC | Age: 44
End: 2019-03-19

## 2019-03-19 DIAGNOSIS — K59.00 CONSTIPATION, UNSPECIFIED CONSTIPATION TYPE: ICD-10-CM

## 2019-03-19 DIAGNOSIS — R10.84 ABDOMINAL PAIN, GENERALIZED: Primary | ICD-10-CM

## 2019-03-19 NOTE — TELEPHONE ENCOUNTER
S: Patient calling in about generalized dull abdominal pain and low back pain. This pain began 2 days ago without nausea/vomiting/diarrhea/GI bleeding. She states she last had a large BM 2 days prior, and has had some small hard stool this evening and is passing flatus. Her appetite is intact. This is a new pain for her. She was evaluated in the ER earlier today with a completely benign abdominal exam, negative UPT, normal vitals, normal CBC, lytes, and renal function, and UA, and total relief of her symptoms with tylenol and 1L IVF. Her only abdominal surgery is C/section and tubal ligation in 2014. She was discharged from the ER with a diagnosis of viral pharyngitis, and since then has noticed the abdominal pain slowly return, despite one additional dose of tylenol. She calls asking for advice about how to manage her symptoms at home.    A/P: 43 year old female with benign-sounding abdominal pain that is possibly related to mild constipation or gas. ER exam ruled out serious intra-abdominal infection, ectopic pregnancy, UTI, GI bleed. I counseled the patient that she can use tylenol, ibuprofen, and simethicone for her pain. I also advised her to try the cat/cow yoga pose or child's pose to help relieve her pain. I advised that if her pain does not improve in the next few days, that she should come to the clinic for follow up. I also advised that if her pain acutely worsens, she is unable to tolerate PO intake, or develops a fever, or notices blood in her vomit or stool, that she should return to the ER for evaluation. Deisy acknowledged understanding and agreement with this plan.    DO [facility] Resident PGY-1  [phone]

## 2019-03-20 LAB
BACTERIA SPEC CULT: NORMAL
SPECIMEN SOURCE: NORMAL

## 2019-03-20 NOTE — RESULT ENCOUNTER NOTE
Final Beta strep group A r/o culture is NEGATIVE for Group A streptococcus.    No treatment or change in treatment per [facility] Strep protocol.

## 2019-03-21 ENCOUNTER — OFFICE VISIT (OUTPATIENT)
Dept: FAMILY MEDICINE | Facility: CLINIC | Age: 44
End: 2019-03-21
Payer: COMMERCIAL

## 2019-03-21 ENCOUNTER — ANCILLARY PROCEDURE (OUTPATIENT)
Dept: GENERAL RADIOLOGY | Facility: CLINIC | Age: 44
End: 2019-03-21
Attending: FAMILY MEDICINE
Payer: COMMERCIAL

## 2019-03-21 VITALS
TEMPERATURE: 97.8 F | DIASTOLIC BLOOD PRESSURE: 82 MMHG | RESPIRATION RATE: 18 BRPM | SYSTOLIC BLOOD PRESSURE: 118 MMHG | OXYGEN SATURATION: 99 % | WEIGHT: 121 LBS | BODY MASS INDEX: 18.95 KG/M2 | HEART RATE: 86 BPM

## 2019-03-21 DIAGNOSIS — R10.13 EPIGASTRIC PAIN: ICD-10-CM

## 2019-03-21 DIAGNOSIS — R10.84 ABDOMINAL PAIN, GENERALIZED: ICD-10-CM

## 2019-03-21 DIAGNOSIS — R10.84 ABDOMINAL PAIN, GENERALIZED: Primary | ICD-10-CM

## 2019-03-21 RX ORDER — RIBOFLAVIN (VITAMIN B2) 100 MG
100 TABLET ORAL DAILY
COMMUNITY
End: 2022-11-15

## 2019-03-21 NOTE — PROGRESS NOTES
HOWIE       Deisy is a 43 year old  female  who presents for the new concern(s) of:    Chief Complaint   Patient presents with     Headache     ER follow up       Her pain is epigastric.  Is very intermittent. Feels a bit like the pain after her .   Sometimes feels like there is gas, that needs to move around.  General pain that gets better with tylenol but not necessarily relief of gas.  When she is on the tylenol it just gets better.  20 minutes later.  This am she took it again and it helped again.  When pain is very bad, she would keep moving but that would not help.   Has been having diarrhea off an on.  Going on since 3/17.   she got a fever 3/17 - her son has been ill with an URI - that was pretty bad.  Also had a sore throat, and the fever and congestion and then that night the pain started.  Came on after sitting on a bench for 3 hours and thought the back pain was from this.  Back hurts intermittently.  No back pain since MOnday.    Has not thrown up. Was nauseated  night. Has had less appetite for the last few days but appetite is now back. Since being the ED she is eating with each meal - a bit but not much.  nausea has resolved.   Diarrhea is a bit better today too.  Only one BM this am.     HAs:  Much better on her ketogenic diet.    B2 400mg daily, [id number] mg daily    When she has her migraines she fasts.    Having less periods: 2018, 2019.  Missing menses happened when she started the diet and lost a lot of weight.  Wieght has now stabilized.  Is up a bit and had her period again last month.   Ketogenic diet - is not eating a lot of carbs. 20 - 30g/d.  Meat and vegetables.     Losing her hair.  But that seems to be better again too.            Patient Active Problem List   Diagnosis     Health Care Home     Migraine     History of  section     Family history of first degree relative with congenital heart disease     Tubal Ligation Consent Signed 4/15/14        Current Outpatient Medications   Medication Sig Dispense Refill     ACETAMINOPHEN PO Take 1 tablet by mouth as needed       IBUPROFEN PO        magnesium oxide (MAG-OX) 400 (241.3 MG) MG tablet Take 1 tablet (400 mg) by mouth daily       Pseudoephedrine HCl (SUDAFED PO)        SUMAtriptan (IMITREX STATDOSE) 6 MG/0.5ML pen injector kit Inject 0.5 mLs (6 mg) Subcutaneous at onset of headache for migraine May repeat in 1 hour. Max 12 mg/24 hours. 2 kit 1     SUMAtriptan (IMITREX) 50 MG tablet Take 1 tablet (50 mg) by mouth at onset of headache for migraine May repeat dose in 2 hours.  Do not exceed 200 mg in 24 hours 20 tablet 1          Allergies   Allergen Reactions     Amoxicillin Rash     Diffuse maculopapular rash     Iodide Rash     Skin rash after  prep.                Review of Systems:               Physical Exam:     Vitals:    19 1334   BP: 118/82   BP Location: Left arm   Patient Position: Chair   Cuff Size: Adult Regular   Pulse: 86   Resp: 18   Temp: 97.8  F (36.6  C)   TempSrc: Oral   SpO2: 99%   Weight: 54.9 kg (121 lb)     Body mass index is 18.95 kg/m .  Vitals were reviewed and were normal  GENERAL: healthy, alert, well nourished, well hydrated, no distress  NECK: no tenderness, no adenopathy, no asymmetry, no masses, no stiffness; thyroid- normal to palpation  RESP: lungs clear to auscultation - no rales, no rhonchi, no wheezes  CV: regular rates and rhythm, normal S1 S2, no S3 or S4 and no murmur, no click or rub -  ABDOMEN: soft, slight epigastric tenderness, no  hepatosplenomegaly, no masses, normal bowel sounds. + bruit  MS: extremities- no gross deformities noted, no edema    Admission on 2019, Discharged on 2019   Component Date Value Ref Range Status     Color Urine 2019 Yellow   Final     Appearance Urine 2019 Clear   Final     Glucose Urine 2019 Negative  NEG^Negative mg/dL Final     Bilirubin Urine 2019 Negative  NEG^Negative Final      Ketones Urine 03/18/2019 >150* NEG^Negative mg/dL Final     Specific Gravity Urine 03/18/2019 1.020  1.003 - 1.035 Final     Blood Urine 03/18/2019 Small* NEG^Negative Final     pH Urine 03/18/2019 5.5  5.0 - 7.0 pH Final     Protein Albumin Urine 03/18/2019 100* NEG^Negative mg/dL Final     Urobilinogen mg/dL 03/18/2019 Normal  0.0 - 2.0 mg/dL Final     Nitrite Urine 03/18/2019 Negative  NEG^Negative Final     Leukocyte Esterase Urine 03/18/2019 Negative  NEG^Negative Final     Source 03/18/2019 Midstream Urine   Final     WBC Urine 03/18/2019 <1  0 - 5 /HPF Final     RBC Urine 03/18/2019 2  0 - 2 /HPF Final     Squamous Epithelial /HPF Urine 03/18/2019 <1  0 - 1 /HPF Final     Mucous Urine 03/18/2019 Present* NEG^Negative /LPF Final     Hyaline Casts 03/18/2019 1  0 - 2 /LPF Final     HCG Qual Urine 03/18/2019 Negative  neg Final     Internal QC OK 03/18/2019 Yes   Final     WBC 03/18/2019 4.9  4.0 - 11.0 10e9/L Final     RBC Count 03/18/2019 4.17  3.8 - 5.2 10e12/L Final     Hemoglobin 03/18/2019 13.3  11.7 - 15.7 g/dL Final     Hematocrit 03/18/2019 39.3  35.0 - 47.0 % Final     MCV 03/18/2019 94  78 - 100 fl Final     MCH 03/18/2019 31.9  26.5 - 33.0 pg Final     MCHC 03/18/2019 33.8  31.5 - 36.5 g/dL Final     RDW 03/18/2019 13.4  10.0 - 15.0 % Final     Platelet Count 03/18/2019 239  150 - 450 10e9/L Final     Diff Method 03/18/2019 Automated Method   Final     % Neutrophils 03/18/2019 84.5  % Final     % Lymphocytes 03/18/2019 6.7  % Final     % Monocytes 03/18/2019 8.2  % Final     % Eosinophils 03/18/2019 0.0  % Final     % Basophils 03/18/2019 0.4  % Final     % Immature Granulocytes 03/18/2019 0.2  % Final     Nucleated RBCs 03/18/2019 0  0 /100 Final     Absolute Neutrophil 03/18/2019 4.1  1.6 - 8.3 10e9/L Final     Absolute Lymphocytes 03/18/2019 0.3* 0.8 - 5.3 10e9/L Final     Absolute Monocytes 03/18/2019 0.4  0.0 - 1.3 10e9/L Final     Absolute Eosinophils 03/18/2019 0.0  0.0 - 0.7 10e9/L Final      Absolute Basophils 03/18/2019 0.0  0.0 - 0.2 10e9/L Final     Abs Immature Granulocytes 03/18/2019 0.0  0 - 0.4 10e9/L Final     Absolute Nucleated RBC 03/18/2019 0.0   Final     Sodium 03/18/2019 134  133 - 144 mmol/L Final     Potassium 03/18/2019 3.2* 3.4 - 5.3 mmol/L Final     Chloride 03/18/2019 97  94 - 109 mmol/L Final     Carbon Dioxide 03/18/2019 22  20 - 32 mmol/L Final     Anion Gap 03/18/2019 15* 3 - 14 mmol/L Final     Glucose 03/18/2019 94  70 - 99 mg/dL Final     Urea Nitrogen 03/18/2019 7  7 - 30 mg/dL Final     Creatinine 03/18/2019 0.63  0.52 - 1.04 mg/dL Final     GFR Estimate 03/18/2019 >90  >60 mL/min/[1.73_m2] Final    Comment: Non  GFR Calc  Starting 12/18/2018, serum creatinine based estimated GFR (eGFR) will be   calculated using the Chronic Kidney Disease Epidemiology Collaboration   (CKD-EPI) equation.       GFR Estimate If Black 03/18/2019 >90  >60 mL/min/[1.73_m2] Final    Comment:  GFR Calc  Starting 12/18/2018, serum creatinine based estimated GFR (eGFR) will be   calculated using the Chronic Kidney Disease Epidemiology Collaboration   (CKD-EPI) equation.       Calcium 03/18/2019 8.6  8.5 - 10.1 mg/dL Final     Specimen Description 03/18/2019 Throat   Final     Rapid Strep A Screen 03/18/2019 NEGATIVE: No Group A streptococcal antigen detected by immunoassay, await culture report.   Final     Influenza A/B Agn Specimen 03/18/2019 Nasopharyngeal   Final     Influenza A 03/18/2019 Negative  NEG^Negative Final     Influenza B 03/18/2019 Negative  NEG^Negative Final    Comment: Test results must be correlated with clinical data. If necessary, results   should be confirmed by a molecular assay or viral culture.       Specimen Description 03/18/2019 Throat   Final     Culture Micro 03/18/2019 No Beta Streptococcus isolated   Final         Assessment and Plan     Deisy was seen today for headache.    Diagnoses and all orders for this visit:    Abdominal  pain, generalized - xray since not done.  Negative.  No evidence of excess stool burden.  All her symptoms seem to be improving except for the abdominal pain which is remarkably improved with Tylenol.  Food does not make her symptoms worse.  We will give her a few more days to see if that does not improve on its own.  Also trial of PPI in case this is a form of gastritis.  She will get back to me if no better in the next few days.  I think her bruits probably physiologic due to her absence of body fat, but if she is not much improved in the next couple of days will recommend an ultrasound.  -     XR Abdomen 2 Views; Future    Epigastric pain  -     omeprazole (PRILOSEC) 20 MG DR capsule; Take 1 capsule (20 mg) by mouth daily    Migraines -diet controlled.  Had lost weight with her diet  But appears to be doing better.  Has regained some of her weight.  If belly symptoms continue in addition to ultrasound may do CT.    Total time: 25 minutes with more than half spent counseling on what her pain might be from, her migraines, weight loss and plans moving forward.   There are no discontinued medications.    Options for treatment and follow-up care were reviewed with the patient . Deisy Bowden  engaged in the decision making process and verbalized understanding of the options discussed and agreed with the final plan.    Barbara Santos MD

## 2019-03-21 NOTE — PATIENT INSTRUCTIONS
Here is the plan from today's visit    1. Abdominal pain, generalized  Take this every day, first thing in the am, before you eat - for about 1 week. If you are still continuing with the pain and/or it gets worse in any way, we should do a CT scan.    Max Tylenol is 3 gm a day.   - XR Abdomen 2 Views; Future    2. Epigastric pain  - omeprazole (PRILOSEC) 20 MG DR capsule; Take 1 capsule (20 mg) by mouth daily  Dispense: 30 capsule; Refill: 0    3. If you continue to lose hair, and/or weight, and/or no periods then come back    Please call or return to clinic if your symptoms don't go away.    Follow up plan  As needed     Thank you for coming to [facility] today.  Lab Testing:  **If you had lab testing today and your results are reassuring or normal they will be mailed to you or sent through [facility] within 7 days.   **If the lab tests need quick action we will call you with the results.  The phone number we will call with results is # [phone] (home) . If this is not the best number please call our clinic and change the number.  Medication Refills:  If you need any refills please call your pharmacy and they will contact us.   If you need to  your refill at a new pharmacy, please contact the new pharmacy directly. The new pharmacy will help you get your medications transferred faster.   Scheduling:  If you have any concerns about today's visit or wish to schedule another appointment please call our office during normal business hours [phone] (8-5:00 M-F)  If a referral was made to a [facility] and you don't get a call from central scheduling please call [phone].  If a Mammogram was ordered for you at [facility] call [phone] to schedule or change your appointment.  If you had an XRay/CT/Ultrasound/MRI ordered the number is [phone] to schedule or change your radiology appointment.   Medical Concerns:  If you have urgent medical concerns please call  [phone] at any time of the day.    Barbara Santos MD

## 2019-07-05 DIAGNOSIS — L71.9 ROSACEA: Primary | ICD-10-CM

## 2019-07-05 RX ORDER — METRONIDAZOLE 7.5 MG/G
GEL TOPICAL 2 TIMES DAILY
Qty: 45 G | Refills: 3 | Status: SHIPPED | OUTPATIENT
Start: 2019-07-05 | End: 2020-11-06

## 2019-07-31 ENCOUNTER — OFFICE VISIT (OUTPATIENT)
Dept: DERMATOLOGY | Facility: CLINIC | Age: 44
End: 2019-07-31
Payer: COMMERCIAL

## 2019-07-31 DIAGNOSIS — L71.9 ACNE ROSACEA: Primary | ICD-10-CM

## 2019-07-31 ASSESSMENT — PAIN SCALES - GENERAL: PAINLEVEL: NO PAIN (0)

## 2019-07-31 NOTE — LETTER
2019       RE: Deisy Bowden  [address] [place] [id number]     Dear Colleague,    Thank you for referring your patient, Deisy Bowden, to the [facility] DERMATOLOGY at [facility]. Please see a copy of my visit note below.    [facility] Dermatology Note      Dermatology Problem List:  1.Rosacea     Encounter Date: 2019    CC:  Chief Complaint   Patient presents with     Derm Problem     Rosacea - recently started Metrogel with improvement.         History of Present Illness:  Ms. Deisy Bowden is a 44 year old female who is new to dermatology. She presents today with a one year history of Rosacea diagnosed and treated in the past by her primary care physician. One year ago she developed redness of the nose, cheeks, and chin that subsequently developed into acne bumps on the forehead and cheeks. She experienced itching and flaking at the time, that was exacerbated with heat and stress. She denies any flushing or occular symptoms. When she first experienced symptoms a year ago she tried Nazerole and then an anti-itch cream of which she can not recall the name. She saw no improvement, so a month ago her PCP started her on metrogel 0.75%. She uses the Metrogel twice a day, morning and night after washing her face, and reports that this has cleared up the acne bumps, but she continues to have redness of the cheeks, nose and chin. She wants to know if her current treatment is the best regime for her condition and inquire about the residual redness and any treatments for it. She would also like to know if she needs to see a dermatologist regularly for skin checks at this time. She is otherwise well and has no other concerns today.    Past Medical History:   Patient Active Problem List   Diagnosis     Health Care Home     Migraine     History of  section     Family history of first degree relative with congenital heart  disease     Tubal Ligation Consent Signed 4/15/14     Past Medical History:   Diagnosis Date     Abnormal Pap smear      Currently pregnant 15     Diabetes mellitus (H)      Menarche age 18     Past Surgical History:   Procedure Laterality Date      SECTION  09      SECTION  10/1/2011    Procedure: SECTION; Surgeon:SANTOSH NOWAK; Location:[facility]      SECTION, TUBAL LIGATION, COMBINED  2014    Procedure: COMBINED  SECTION, TUBAL LIGATION;  Surgeon: Chelsi Danielle MD;  Location: [facility] L[facility]       Social History:  Patient reports that she has never smoked. She has never used smokeless tobacco. She reports that she does not drink alcohol or use drugs.    Family History:  Family History   Problem Relation Age of Onset     Cancer Maternal Grandmother 80        breast     Breast Cancer Maternal Grandmother      Cerebrovascular Disease Paternal Grandmother      Diabetes No family hx of      Melanoma No family hx of      Skin Cancer No family hx of        Medications:  Current Outpatient Medications   Medication Sig Dispense Refill     ACETAMINOPHEN PO Take 1 tablet by mouth as needed       IBUPROFEN PO        magnesium oxide (MAG-OX) 400 (241.3 MG) MG tablet Take 1 tablet (400 mg) by mouth daily       metroNIDAZOLE (METROGEL) 0.75 % external gel Apply topically 2 times daily to entire affected areas after washing. 45 g 3     riboflavin (VITAMIN  B-2) 100 MG TABS tablet Take 100 mg by mouth daily       SUMAtriptan (IMITREX STATDOSE) 6 MG/0.5ML pen injector kit Inject 0.5 mLs (6 mg) Subcutaneous at onset of headache for migraine May repeat in 1 hour. Max 12 mg/24 hours. (Patient not taking: Reported on 3/21/2019) 2 kit 1     SUMAtriptan (IMITREX) 50 MG tablet Take 1 tablet (50 mg) by mouth at onset of headache for migraine May repeat dose in 2 hours.  Do not exceed 200 mg in 24 hours (Patient not taking: Reported on 3/21/2019) 20 tablet 1     Allergies    Allergen Reactions     Amoxicillin Rash     Diffuse maculopapular rash     Iodide Rash     Skin rash after  prep.         Review of Systems:  -Negative except as stated in HPI  -Constitutional: Otherwise feeling well today, in usual state of health.  -HEENT: Patient denies nonhealing oral sores.  -Skin: As above in HPI. No additional skin concerns.    Physical exam:  Vitals: There were no vitals taken for this visit.  GEN: Well nourished female, in no acute distress and in good spirits today  SKIN: A focused exam of the face and eyes was performed  -Pale skin with scattered light brown macules of the forehead, cheeks and nose  -Mild erythema of the cheeks bilaterally, chin, and alar folds  -No comedones or pustules, no occular erythema   -No other lesions of concern on areas examined.     Impression/Plan:    1. Rosacea     Continue Metrogel 0.75% twice daily as needed or begin to taper/decrease usage if symptoms are adequately contyrolled    Discussed possible use of tetracyclines but felt that they were not needed at this time, as symptoms are adequately controlled on the Metrogel and the patient denies any flushing or occular symptoms     Patient can follow up with PCP for Metrogel refills    2. Fair Skin     Sun precaution was advised including the use of sun screens of SPF 30 or higher, sun protective clothing, and avoidance of tanning beds.    CC Barbara Santos MD  2020 [address] [phone] on close of this encounter.  Follow-up as needed or sooner if lesions are concerning, changing, or new.     Staff Involved:  I,Najma Vang, saw and examined the patient in the presence of Dr. Waldron.    Deisy Bowden was seen and evaluated by myself with the medical student recording the encounter acting as scribe.  I have reviewed the scribed note for completeness and accuracy and made appropriate corrections and amendments.    Ramiro GUERRERO

## 2019-07-31 NOTE — NURSING NOTE
Dermatology Rooming Note    Deisy Bowden's goals for this visit include:   Chief Complaint   Patient presents with     Derm Problem     Rosacea - recently started Metrogel with improvement.     María Hernandez, CMA

## 2019-07-31 NOTE — PROGRESS NOTES
[facility] Dermatology Note      Dermatology Problem List:  1.Rosacea     Encounter Date: 2019    CC:  Chief Complaint   Patient presents with     Derm Problem     Rosacea - recently started Metrogel with improvement.         History of Present Illness:  Ms. Deisy Bowden is a 44 year old female who is new to dermatology. She presents today with a one year history of Rosacea diagnosed and treated in the past by her primary care physician. One year ago she developed redness of the nose, cheeks, and chin that subsequently developed into acne bumps on the forehead and cheeks. She experienced itching and flaking at the time, that was exacerbated with heat and stress. She denies any flushing or occular symptoms. When she first experienced symptoms a year ago she tried Nazerole and then an anti-itch cream of which she can not recall the name. She saw no improvement, so a month ago her PCP started her on metrogel 0.75%. She uses the Metrogel twice a day, morning and night after washing her face, and reports that this has cleared up the acne bumps, but she continues to have redness of the cheeks, nose and chin. She wants to know if her current treatment is the best regime for her condition and inquire about the residual redness and any treatments for it. She would also like to know if she needs to see a dermatologist regularly for skin checks at this time. She is otherwise well and has no other concerns today.    Past Medical History:   Patient Active Problem List   Diagnosis     Health Care Home     Migraine     History of  section     Family history of first degree relative with congenital heart disease     Tubal Ligation Consent Signed 4/15/14     Past Medical History:   Diagnosis Date     Abnormal Pap smear      Currently pregnant 15     Diabetes mellitus (H)      Menarche age 18     Past Surgical History:   Procedure Laterality Date      SECTION  09       SECTION  10/1/2011    Procedure: SECTION; Surgeon:SANTOSH NOWAK; Location:[facility]      SECTION, TUBAL LIGATION, COMBINED  2014    Procedure: COMBINED  SECTION, TUBAL LIGATION;  Surgeon: Chelsi Danielle MD;  Location: [facility]       Social History:  Patient reports that she has never smoked. She has never used smokeless tobacco. She reports that she does not drink alcohol or use drugs.    Family History:  Family History   Problem Relation Age of Onset     Cancer Maternal Grandmother 80        breast     Breast Cancer Maternal Grandmother      Cerebrovascular Disease Paternal Grandmother      Diabetes No family hx of      Melanoma No family hx of      Skin Cancer No family hx of        Medications:  Current Outpatient Medications   Medication Sig Dispense Refill     ACETAMINOPHEN PO Take 1 tablet by mouth as needed       IBUPROFEN PO        magnesium oxide (MAG-OX) 400 (241.3 MG) MG tablet Take 1 tablet (400 mg) by mouth daily       metroNIDAZOLE (METROGEL) 0.75 % external gel Apply topically 2 times daily to entire affected areas after washing. 45 g 3     riboflavin (VITAMIN  B-2) 100 MG TABS tablet Take 100 mg by mouth daily       SUMAtriptan (IMITREX STATDOSE) 6 MG/0.5ML pen injector kit Inject 0.5 mLs (6 mg) Subcutaneous at onset of headache for migraine May repeat in 1 hour. Max 12 mg/24 hours. (Patient not taking: Reported on 3/21/2019) 2 kit 1     SUMAtriptan (IMITREX) 50 MG tablet Take 1 tablet (50 mg) by mouth at onset of headache for migraine May repeat dose in 2 hours.  Do not exceed 200 mg in 24 hours (Patient not taking: Reported on 3/21/2019) 20 tablet 1     Allergies   Allergen Reactions     Amoxicillin Rash     Diffuse maculopapular rash     Iodide Rash     Skin rash after  prep.         Review of Systems:  -Negative except as stated in HPI  -Constitutional: Otherwise feeling well today, in usual state of health.  -HEENT: Patient denies  nonhealing oral sores.  -Skin: As above in HPI. No additional skin concerns.    Physical exam:  Vitals: There were no vitals taken for this visit.  GEN: Well nourished female, in no acute distress and in good spirits today  SKIN: A focused exam of the face and eyes was performed  -Pale skin with scattered light brown macules of the forehead, cheeks and nose  -Mild erythema of the cheeks bilaterally, chin, and alar folds  -No comedones or pustules, no occular erythema   -No other lesions of concern on areas examined.     Impression/Plan:    1. Rosacea     Continue Metrogel 0.75% twice daily as needed or begin to taper/decrease usage if symptoms are adequately contyrolled    Discussed possible use of tetracyclines but felt that they were not needed at this time, as symptoms are adequately controlled on the Metrogel and the patient denies any flushing or occular symptoms     Patient can follow up with PCP for Metrogel refills    2. Fair Skin     Sun precaution was advised including the use of sun screens of SPF 30 or higher, sun protective clothing, and avoidance of tanning beds.    CC Barbara Santos MD  [address] [phone] on close of this encounter.  Follow-up as needed or sooner if lesions are concerning, changing, or new.     Staff Involved:  I,Najma Vang, saw and examined the patient in the presence of Dr. Waldron.    ElizabethCharo Bowden was seen and evaluated by myself with the medical student recording the encounter acting as scribe.  I have reviewed the scribed note for completeness and accuracy and made appropriate corrections and amendments.    Ramiro GUERRERO

## 2019-11-04 ENCOUNTER — HEALTH MAINTENANCE LETTER (OUTPATIENT)
Age: 44
End: 2019-11-04

## 2020-08-06 DIAGNOSIS — Z00.00 ENCOUNTER FOR PREVENTIVE CARE: Primary | ICD-10-CM

## 2020-09-16 ENCOUNTER — ANCILLARY PROCEDURE (OUTPATIENT)
Dept: MAMMOGRAPHY | Facility: CLINIC | Age: 45
End: 2020-09-16
Attending: FAMILY MEDICINE
Payer: COMMERCIAL

## 2020-09-16 DIAGNOSIS — Z00.00 ENCOUNTER FOR PREVENTIVE CARE: ICD-10-CM

## 2020-09-16 PROCEDURE — 77067 SCR MAMMO BI INCL CAD: CPT

## 2020-09-18 ENCOUNTER — TRANSFERRED RECORDS (OUTPATIENT)
Dept: HEALTH INFORMATION MANAGEMENT | Facility: CLINIC | Age: 45
End: 2020-09-18

## 2020-11-06 DIAGNOSIS — L71.9 ROSACEA: ICD-10-CM

## 2020-11-06 RX ORDER — METRONIDAZOLE 7.5 MG/G
GEL TOPICAL 2 TIMES DAILY
Qty: 45 G | Refills: 3 | Status: SHIPPED | OUTPATIENT
Start: 2020-11-06 | End: 2022-11-15

## 2020-11-06 NOTE — TELEPHONE ENCOUNTER
"Request for medication refill:  metroNIDAZOLE (METROGEL) 0.75 % external gel    Providers if patient needs an appointment and you are willing to give a one month supply please refill for one month and  send a letter/MyChart using \".SMILLIMITEDREFILL\" .smillimited and route chart to \"P [facility] \" (Giving one month refill in non controlled medications is strongly recommended before denial)    If refill has been denied, meaning absolutely no refills without visit, please complete the smart phrase \".smirxrefuse\" and route it to the \"P SMI MED REFILLS\"  pool to inform the patient and the pharmacy.    Padmini Lane MA        "

## 2020-11-22 ENCOUNTER — HEALTH MAINTENANCE LETTER (OUTPATIENT)
Age: 45
End: 2020-11-22

## 2021-09-18 ENCOUNTER — HEALTH MAINTENANCE LETTER (OUTPATIENT)
Age: 46
End: 2021-09-18

## 2021-09-21 ENCOUNTER — ANCILLARY PROCEDURE (OUTPATIENT)
Dept: MAMMOGRAPHY | Facility: CLINIC | Age: 46
End: 2021-09-21
Attending: FAMILY MEDICINE
Payer: COMMERCIAL

## 2021-09-21 DIAGNOSIS — Z12.31 VISIT FOR SCREENING MAMMOGRAM: ICD-10-CM

## 2021-09-21 PROCEDURE — 77067 SCR MAMMO BI INCL CAD: CPT | Mod: GC | Performed by: STUDENT IN AN ORGANIZED HEALTH CARE EDUCATION/TRAINING PROGRAM

## 2021-09-21 PROCEDURE — 77063 BREAST TOMOSYNTHESIS BI: CPT | Mod: GC | Performed by: STUDENT IN AN ORGANIZED HEALTH CARE EDUCATION/TRAINING PROGRAM

## 2022-01-08 ENCOUNTER — HEALTH MAINTENANCE LETTER (OUTPATIENT)
Age: 47
End: 2022-01-08

## 2022-09-23 ENCOUNTER — ANCILLARY PROCEDURE (OUTPATIENT)
Dept: MAMMOGRAPHY | Facility: CLINIC | Age: 47
End: 2022-09-23
Attending: FAMILY MEDICINE
Payer: COMMERCIAL

## 2022-09-23 DIAGNOSIS — Z12.31 VISIT FOR SCREENING MAMMOGRAM: ICD-10-CM

## 2022-09-23 PROCEDURE — 77063 BREAST TOMOSYNTHESIS BI: CPT | Performed by: RADIOLOGY

## 2022-09-23 PROCEDURE — 77067 SCR MAMMO BI INCL CAD: CPT | Performed by: RADIOLOGY

## 2022-11-15 ENCOUNTER — OFFICE VISIT (OUTPATIENT)
Dept: FAMILY MEDICINE | Facility: CLINIC | Age: 47
End: 2022-11-15
Payer: COMMERCIAL

## 2022-11-15 VITALS
BODY MASS INDEX: 20.56 KG/M2 | TEMPERATURE: 98.6 F | SYSTOLIC BLOOD PRESSURE: 110 MMHG | RESPIRATION RATE: 16 BRPM | HEIGHT: 67 IN | HEART RATE: 97 BPM | OXYGEN SATURATION: 100 % | DIASTOLIC BLOOD PRESSURE: 70 MMHG | WEIGHT: 131 LBS

## 2022-11-15 DIAGNOSIS — Z00.00 ROUTINE GENERAL MEDICAL EXAMINATION AT A HEALTH CARE FACILITY: Primary | ICD-10-CM

## 2022-11-15 DIAGNOSIS — Z12.4 CERVICAL CANCER SCREENING: ICD-10-CM

## 2022-11-15 DIAGNOSIS — Z12.11 SCREEN FOR COLON CANCER: ICD-10-CM

## 2022-11-15 PROCEDURE — 87624 HPV HI-RISK TYP POOLED RSLT: CPT | Performed by: FAMILY MEDICINE

## 2022-11-15 PROCEDURE — G0145 SCR C/V CYTO,THINLAYER,RESCR: HCPCS | Performed by: FAMILY MEDICINE

## 2022-11-15 PROCEDURE — 99386 PREV VISIT NEW AGE 40-64: CPT | Performed by: FAMILY MEDICINE

## 2022-11-15 ASSESSMENT — ENCOUNTER SYMPTOMS
CHILLS: 0
DYSURIA: 0
CONSTIPATION: 0
ARTHRALGIAS: 0
FEVER: 0
ABDOMINAL PAIN: 0
DIZZINESS: 0
HEMATOCHEZIA: 0
NAUSEA: 0
FREQUENCY: 0
WEAKNESS: 0
HEMATURIA: 0
HEADACHES: 0
MYALGIAS: 0
SHORTNESS OF BREATH: 0
PALPITATIONS: 0
DIARRHEA: 0
HEARTBURN: 0
COUGH: 0
PARESTHESIAS: 0
JOINT SWELLING: 0
NERVOUS/ANXIOUS: 0
SORE THROAT: 0
EYE PAIN: 0

## 2022-11-15 ASSESSMENT — PAIN SCALES - GENERAL: PAINLEVEL: NO PAIN (0)

## 2022-11-15 NOTE — PROGRESS NOTES
SUBJECTIVE:   CC: Deisy is an 47 year old who presents for preventive health visit.       Patient has been advised of split billing requirements and indicates understanding: Yes  Healthy Habits:     Getting at least 3 servings of Calcium per day:  Yes    Bi-annual eye exam:  Yes    Dental care twice a year:  Yes    Sleep apnea or symptoms of sleep apnea:  None    Diet:  Regular (no restrictions)    Frequency of exercise:  4-5 days/week    Duration of exercise:  15-30 minutes    Taking medications regularly:  Not Applicable    Medication side effects:  Not applicable    PHQ-2 Total Score: 0    Additional concerns today:  No              Today's PHQ-2 Score:   PHQ-2 ( 1999 [employer]) 11/15/2022   Q1: Little interest or pleasure in doing things 0   Q2: Feeling down, depressed or hopeless 0   PHQ-2 Score 0   PHQ-2 Total Score (12-17 Years)- Positive if 3 or more points; Administer PHQ-A if positive -   Q1: Little interest or pleasure in doing things Not at all   Q2: Feeling down, depressed or hopeless Not at all   PHQ-2 Score 0       Abuse: Current or Past (Physical, Sexual or Emotional) - No  Do you feel safe in your environment? Yes    Have you ever done Advance Care Planning? (For example, a Health Directive, POLST, or a discussion with a medical provider or your loved ones about your wishes): No, advance care planning information given to patient to review.  Patient plans to discuss their wishes with loved ones or provider.      Social History     Tobacco Use     Smoking status: Never     Smokeless tobacco: Never   Substance Use Topics     Alcohol use: No         Alcohol Use 11/15/2022   Prescreen: >3 drinks/day or >7 drinks/week? No       Reviewed orders with patient.  Reviewed health maintenance and updated orders accordingly - Yes      Breast Cancer Screening:    FHS-7:   Breast CA Risk Assessment (FHS-7) 9/21/2021 9/23/2022 11/15/2022   Did any of your first-degree relatives have breast or ovarian cancer? No No  No   Did any of your relatives have bilateral breast cancer? No No No   Did any man in your family have breast cancer? No No No   Did any woman in your family have breast and ovarian cancer? No No Yes   Did any woman in your family have breast cancer before age 50 y? No No No   Do you have 2 or more relatives with breast and/or ovarian cancer? No No No   Do you have 2 or more relatives with breast and/or bowel cancer? No No No       Mammogram Screening: Recommended annual mammography  Pertinent mammograms are reviewed under the imaging tab.    History of abnormal Pap smear: NOT since early 2000 - age 30-65 PAP every 5 years with negative HPV co-testing recommended  PAP / HPV Latest Ref Rng & Units 11/1/2016 11/18/2011 9/17/2009   PAP (Historical) - NIL NIL NIL   HPV16 NEG Negative - -   HPV18 NEG Negative - -   HRHPV NEG Negative - -     Reviewed and updated as needed this visit by clinical staff   Tobacco  Allergies  Meds  Problems  Med Hx  Surg Hx  Fam Hx  Soc   Hx        Reviewed and updated as needed this visit by Provider   Tobacco  Allergies  Meds  Problems  Med Hx  Surg Hx  Fam Hx             Review of Systems   Constitutional: Negative for chills and fever.   HENT: Negative for congestion, ear pain, hearing loss and sore throat.    Eyes: Negative for pain and visual disturbance.   Respiratory: Negative for cough and shortness of breath.    Cardiovascular: Negative for chest pain, palpitations and peripheral edema.   Gastrointestinal: Negative for abdominal pain, constipation, diarrhea, heartburn, hematochezia and nausea.   Genitourinary: Negative for dysuria, frequency, genital sores, hematuria and urgency.   Musculoskeletal: Negative for arthralgias, joint swelling and myalgias.   Skin: Negative for rash.   Neurological: Negative for dizziness, weakness, headaches and paresthesias.   Psychiatric/Behavioral: Negative for mood changes. The patient is not nervous/anxious.           OBJECTIVE:  "  /70 (BP Location: Left arm, Patient Position: Sitting, Cuff Size: Adult Regular)   Pulse 97   Temp 98.6  F (37  C) (Temporal)   Resp 16   Ht 1.702 m (5' 7\")   Wt 59.4 kg (131 lb)   SpO2 100%   BMI 20.52 kg/m    Physical Exam  GENERAL: healthy, alert and no distress  EYES: Eyes grossly normal to inspection, PERRL and conjunctivae and sclerae normal  HENT: ear canals and TM's normal, nose and mouth without ulcers or lesions  NECK: no adenopathy, no asymmetry, masses, or scars and thyroid normal to palpation  RESP: lungs clear to auscultation - no rales, rhonchi or wheezes  BREAST: normal without masses, tenderness or nipple discharge and no palpable axillary masses or adenopathy  CV: regular rate and rhythm, normal S1 S2, no S3 or S4, no murmur, click or rub, no peripheral edema and peripheral pulses strong  ABDOMEN: soft, nontender, no hepatosplenomegaly, no masses and bowel sounds normal   (female): normal female external genitalia, normal urethral meatus, vaginal mucosa pink, moist, well rugated, and normal cervix/adnexa/uterus without masses or discharge  MS: no gross musculoskeletal defects noted, no edema  SKIN: no suspicious lesions or rashes  NEURO: Normal strength and tone, mentation intact and speech normal  PSYCH: mentation appears normal, affect normal/bright    Diagnostic Test Results:  Labs reviewed in Epic    ASSESSMENT/PLAN:   Deisy was seen today for physical and gyn exam.    Diagnoses and all orders for this visit:    Routine general medical examination at a health care facility    Mammo: UTD    Pap: due, done today    Colon:due, ordered    Immunizations: due for flu and Covid booster - she'll do with family at [facility] or [facility], declines here today    Routine screening for hep c discussed with patient as recommended by CDC for all adults; no particular risk factors noted, will do when comes back for lipid test.    Discussed healthy lifestyle and aging recommendations including " "regular exercise, adequate and regular sleep, 5+ fruits and veggies daily.  -     Lipid panel reflex to direct LDL Fasting; Future    Screen for colon cancer  -     Colonoscopy Screening  Referral; Future    Cervical cancer screening  -     Pap Screen with HPV - recommended age 30 - 65 years    Other orders  -     REVIEW OF HEALTH MAINTENANCE PROTOCOL ORDERS              COUNSELING:  Reviewed preventive health counseling, as reflected in patient instructions    Estimated body mass index is 20.52 kg/m  as calculated from the following:    Height as of this encounter: 1.702 m (5' 7\").    Weight as of this encounter: 59.4 kg (131 lb).        She reports that she has never smoked. She has never used smokeless tobacco.      Counseling Resources:  ATP IV Guidelines  Pooled Cohorts Equation Calculator  Breast Cancer Risk Calculator  BRCA-Related Cancer Risk Assessment: FHS-7 Tool  FRAX Risk Assessment  ICSI Preventive Guidelines  Dietary Guidelines for Americans, 2010  USDA's MyPlate  ASA Prophylaxis  Lung CA Screening    Leyla Pendleton MD  [facility]  "

## 2022-11-15 NOTE — PATIENT INSTRUCTIONS
Think of Nature as a multivitamin that you should take every day.  Make an effort to spend time outside every day.    If you spend henning in [place] vitamin D 400-1000 daily is a good idea October-April.    Social connections are also like a multivitamin; they give us micro-boosts that keep us healthy and happy. Talk to the check out person in the store, connect with your neighbors.  Make an effort to maintain and sustain social connections in your life.    Food is Medicine. The MIND or Mediterranean diet are the best for heart and brain health as well as have a lower lifetime cancer risk.    Weight lifting exercise 2-3x per week is excellent for bone health and maintaining muscle mass, particularly if you are over 40.  It's also helpful in reducing anxiety and boosting mood.    Move your body every day (exercise!).  Exercise is the most effective way to boost mood, reduce anxiety and depression, reduce risks of many chronic diseases and age well.  Find something you enjoy and do it regularly (walk, run, take a dance class, join a gym, ski, roller-blade, get into yoga, learn jose luis chi...)    Prioritize your sleep! Adults age 26-64 need 7-9 hours of sleep a night.  Older adults 65+ need 7-8 hours of sleep a night.  Studies show that people who sleep seven hours a night are healthier and live longer. Sleeping less than seven hours is associated with a range of health problems including obesity, dementia, heart disease, depression and impaired immune function.    Patient Education        Preventive Health Recommendations  Female Ages 40 to 49    Yearly exam:   See your health care provider every year in order to  Review health changes.   Discuss preventive care.    Review your medicines if your doctor prescribed any.    Get a Pap test every three years (unless you have an abnormal result and your provider advises testing more often).    If you get Pap tests with HPV test, you only need to test every 5 years, unless you have  an abnormal result. You do not need a Pap test if your uterus was removed (hysterectomy) and you have not had cancer.    You should be tested each year for STDs (sexually transmitted diseases), if you're at risk.   Ask your doctor if you should have a mammogram.    Have a colonoscopy (test for colon cancer) if someone in your family has had colon cancer or polyps before age 50.     Have a cholesterol test every 5 years.     Have a diabetes test (fasting glucose) after age 45. If you are at risk for diabetes, you should have this test every 3 years.    Shots: Get a flu shot each year. Get a tetanus shot every 10 years.     Nutrition:   Eat at least 5 servings of fruits and vegetables each day.  Eat whole-grain bread, whole-wheat pasta and brown rice instead of white grains and rice.  Get adequate Calcium and Vitamin D.      Lifestyle  Exercise at least 150 minutes a week (an average of 30 minutes a day, 5 days a week). This will help you control your weight and prevent disease.  Limit alcohol to one drink per day.  No smoking.   Wear sunscreen to prevent skin cancer.  See your dentist every six months for an exam and cleaning.

## 2022-11-17 LAB
BKR LAB AP GYN ADEQUACY: NORMAL
BKR LAB AP GYN INTERPRETATION: NORMAL
BKR LAB AP HPV REFLEX: NORMAL
BKR LAB AP PREVIOUS ABNORMAL: NORMAL
PATH REPORT.COMMENTS IMP SPEC: NORMAL
PATH REPORT.COMMENTS IMP SPEC: NORMAL
PATH REPORT.RELEVANT HX SPEC: NORMAL

## 2022-11-20 ENCOUNTER — HEALTH MAINTENANCE LETTER (OUTPATIENT)
Age: 47
End: 2022-11-20

## 2022-11-21 LAB
HUMAN PAPILLOMA VIRUS 16 DNA: NEGATIVE
HUMAN PAPILLOMA VIRUS 18 DNA: NEGATIVE
HUMAN PAPILLOMA VIRUS FINAL DIAGNOSIS: NORMAL
HUMAN PAPILLOMA VIRUS OTHER HR: NEGATIVE

## 2023-11-25 ENCOUNTER — HEALTH MAINTENANCE LETTER (OUTPATIENT)
Age: 48
End: 2023-11-25

## 2023-12-05 ENCOUNTER — ANCILLARY PROCEDURE (OUTPATIENT)
Dept: MAMMOGRAPHY | Facility: CLINIC | Age: 48
End: 2023-12-05
Attending: FAMILY MEDICINE
Payer: COMMERCIAL

## 2023-12-05 DIAGNOSIS — Z12.31 VISIT FOR SCREENING MAMMOGRAM: ICD-10-CM

## 2023-12-05 PROCEDURE — 77063 BREAST TOMOSYNTHESIS BI: CPT | Mod: GC | Performed by: STUDENT IN AN ORGANIZED HEALTH CARE EDUCATION/TRAINING PROGRAM

## 2023-12-05 PROCEDURE — 77067 SCR MAMMO BI INCL CAD: CPT | Mod: GC | Performed by: STUDENT IN AN ORGANIZED HEALTH CARE EDUCATION/TRAINING PROGRAM

## 2024-01-11 ENCOUNTER — E-VISIT (OUTPATIENT)
Dept: FAMILY MEDICINE | Facility: CLINIC | Age: 49
End: 2024-01-11
Payer: COMMERCIAL

## 2024-01-11 DIAGNOSIS — R21 RASH: Primary | ICD-10-CM

## 2024-01-11 DIAGNOSIS — L71.9 ROSACEA: ICD-10-CM

## 2024-01-11 PROCEDURE — 99207 PR NO BILLABLE SERVICE THIS VISIT: CPT | Performed by: FAMILY MEDICINE

## 2024-01-11 PROCEDURE — 99207 PR NON-BILLABLE SERV PER CHARTING: CPT | Performed by: NURSE PRACTITIONER

## 2024-01-11 RX ORDER — METRONIDAZOLE 7.5 MG/G
GEL TOPICAL 2 TIMES DAILY
Qty: 45 G | Refills: 3 | Status: SHIPPED | OUTPATIENT
Start: 2024-01-11 | End: 2024-08-28

## 2024-01-11 NOTE — PATIENT INSTRUCTIONS
Dear Deisy Bowden,    We are sorry you are not feeling well. Based on the responses you provided, it is recommended that you be seen in-person in urgent care so we can better evaluate your symptoms. Please click here to find the nearest urgent care location to you.   You will not be charged for this Visit. Thank you for trusting us with your care.    Jonelle Dennis NP

## 2024-02-03 ENCOUNTER — HEALTH MAINTENANCE LETTER (OUTPATIENT)
Age: 49
End: 2024-02-03

## 2024-08-19 ENCOUNTER — TELEPHONE (OUTPATIENT)
Dept: FAMILY MEDICINE | Facility: CLINIC | Age: 49
End: 2024-08-19
Payer: COMMERCIAL

## 2024-08-19 NOTE — TELEPHONE ENCOUNTER
Reason for Call:  Appointment Request    Patient requesting this type of appt:  Preventive     Requested provider: Leyla Pendleton MD     Reason patient unable to be scheduled: Not within requested timeframe    When does patient want to be seen/preferred time: 1-2 weeks    Comments: SHE IS HOPING TO GET WORKED IN FOR AN APPOINTMENT, SHE WENT TO AN ER THIS PAST SATURDAY AND SHE HAS AVT AND WANTED TO DISCUSS    Could we send this information to you in [facility] or would you prefer to receive a phone call?:   No preference   Okay to leave a detailed message?: Yes at Home number on file [phone] (home)    Call taken on 8/19/2024 at 1:52 PM by Aubree Berry

## 2024-08-19 NOTE — TELEPHONE ENCOUNTER
Please review and advise if agreeable to a [facility] f/up or if patient should see colleague on site-thanks!

## 2024-08-19 NOTE — TELEPHONE ENCOUNTER
Hold for PCP to address.  Please let patient know PCP will address when back in the office.    Dr. Herrera

## 2024-08-21 NOTE — TELEPHONE ENCOUNTER
Thanks Johana - yes VV double book is okay OR if she'd prefer to be seen in person and I don't have an PAOs could see Carmen or another colleague.    Dr. Leyla Pendleton MD / [facility]

## 2024-08-28 ENCOUNTER — OFFICE VISIT (OUTPATIENT)
Dept: FAMILY MEDICINE | Facility: CLINIC | Age: 49
End: 2024-08-28
Payer: COMMERCIAL

## 2024-08-28 VITALS
SYSTOLIC BLOOD PRESSURE: 108 MMHG | WEIGHT: 123 LBS | HEIGHT: 67 IN | OXYGEN SATURATION: 99 % | BODY MASS INDEX: 19.3 KG/M2 | TEMPERATURE: 98.4 F | DIASTOLIC BLOOD PRESSURE: 66 MMHG | RESPIRATION RATE: 14 BRPM | HEART RATE: 87 BPM

## 2024-08-28 DIAGNOSIS — I47.10 PAROXYSMAL SUPRAVENTRICULAR TACHYCARDIA (H): ICD-10-CM

## 2024-08-28 DIAGNOSIS — R42 VERTIGO: ICD-10-CM

## 2024-08-28 DIAGNOSIS — F41.9 ANXIETY: Primary | ICD-10-CM

## 2024-08-28 PROCEDURE — 99214 OFFICE O/P EST MOD 30 MIN: CPT

## 2024-08-28 PROCEDURE — G2211 COMPLEX E/M VISIT ADD ON: HCPCS

## 2024-08-28 RX ORDER — METOPROLOL SUCCINATE 50 MG/1
50 TABLET, EXTENDED RELEASE ORAL DAILY
COMMUNITY

## 2024-08-28 RX ORDER — HYDROXYZINE HYDROCHLORIDE 25 MG/1
25 TABLET, FILM COATED ORAL 3 TIMES DAILY PRN
Qty: 30 TABLET | Refills: 0 | Status: SHIPPED | OUTPATIENT
Start: 2024-08-28 | End: 2024-08-28

## 2024-08-28 RX ORDER — HYDROXYZINE HYDROCHLORIDE 25 MG/1
25 TABLET, FILM COATED ORAL 3 TIMES DAILY PRN
Qty: 30 TABLET | Refills: 0 | Status: SHIPPED | OUTPATIENT
Start: 2024-08-28

## 2024-08-28 ASSESSMENT — PAIN SCALES - GENERAL: PAINLEVEL: NO PAIN (0)

## 2024-08-28 NOTE — PROGRESS NOTES
Assessment & Plan     (F41.9) Anxiety  (primary encounter diagnosis)  Comment: Chronic with acute episode.  No concerns for acute mental health crisis that would warrant the need for immediate medical attention.  Patient attest to the fact that she has had anxiety in the past, and actually has been previously treated with a daily medication.  She has been anxiety free for a number of years without the need for as needed medication or daily medication.  She more recently has been diagnosed with a cardiac concern that has sparked new onset and quite significant anxiety.  She notes that the changes in her heart rate and acute awareness of her cardiac symptoms caused her severe anxiety that is oftentimes hard to talk herself down from more logically, and more conservative management options such as meditation, self talk techniques, and breathing exercises have not been largely helpful.  She is not currently seeing a therapist, and does not necessarily feel that she needs this.  Not interested in a daily medication, but will move forward with an as needed medication, so she can have something on hand to attempt management of her anxiety rather than her current coping mechanism of calling 911. Offered education on medications including appropriate dosing, possible side effects, and possible adverse effects.  Education given on return to clinic instructions as well as alarm signs that would require the need for immediate medical attention.  Patient attested to understanding.  Plan: hydrOXYzine HCl (ATARAX) 25 MG tablet,     (I47.10) Paroxysmal supraventricular tachycardia (H24)  Comment: Acute.  Patient is currently being followed by cardiology.  Has not been on her full 50 mg dose of metoprolol daily, and is tolerating this quite well.  Heart rate and rhythm in the clinic today are unremarkable.  She has no concerns for medication side effects, and notes that her heart rate on this medication has been between 60 and 70.   She notes that occasionally when she gets anxious her heart rate will sit around 120 for about 5 minutes, but does come down quite nicely.  Justifiable to continue on with this plan of care, and allow her to follow-up with cardiology as previously recommended for further management and adjustments in plan of care.  Patient is agreeable to this plan  Plan: Metoprolol 50 mg once daily  Follow-up with cardiology for ongoing management    (R42) Vertigo  Comment: Chronic and intermittent. Episodes of left-sided vertigo quite intermittently over an extended period of time.  She has not done anything to manage this at this time.  No concerns for more neurological etiologies, no red flag alarm signs that would warrant the need for immediate medical attention.  Neurological examination completely intact.  Plan: Vertigo rehab exercises sent with patient    This progress note has been dictated, with use of voice recognition software. Any grammatical, typographical, or context errors are unintentional and inherent to use of voice recognition software.     Prescription drug management  I spent a total of 33 minutes on the day of the visit.   Time spent by me doing chart review, history and exam, documentation and further activities per the note    MED REC REQUIRED  Post Medication Reconciliation Status:       FUTURE APPOINTMENTS:       - Follow-up visit in 2 to 4 weeks if symptoms worsen or do not improve       - Follow-up for annual visit or as needed  Patient Instructions   I think is a good idea to start you on an as needed anxiety medication.  This is a medication that will need to be taken every day, but is something that you can have on hand to manage your anxiety as needed.  This medication is called Atarax, and you can take 1 pill up to 3 times daily as needed.  This medication can cause some drowsiness, so please be aware of this when taking it, and avoid driving or doing activities that require you to be quite alert.   "As we discussed, this medication is only prescribed to be taken as needed.  You can try other calming and anxiety management techniques before feeling as though you have to try this for your anxiety.  Please let me know if the side effects are intolerable to you, or if this is a medication that does not seem to be beneficial to you.    Seek immediate medical attention with symptoms including chest pain, shortness of breath, nausea and vomiting that prevents you from keeping food down, thunderclap headache, blurry or double vision, changes in hearing, confusion, or acute changes in mental status    I believe left sided vertigo is the cause of your symptoms.  To help with the symptoms, we need to help your body shift the crystals of the inner ear back to the proper position.  To do this, you need to try an exercise at home.  There is a maneuver called the \"half somersault\" that can be very effective in relieving the symptoms of vertigo.  The link for a video on how to perform this is below.  It may take several repetitions of of the maneuver.  [email or web address][email or web address] or go to YouTube and search Noelle Fleming MD Vertigo treatment.    Please follow up with your primary care provide in one week for a recheck of your symptoms.  If you develop severe worsening of the symptoms, fevers, weakness, vomiting not controlled by medication, or any other concerns, please be seen in the ER right away.    Benign Paroxysmal Positional Vertigo     Your health care provider may move your head in certain ways to treat your BPPV.     Benign paroxysmal positional vertigo (BPPV) is a problem with the inner ear. The inner ear contains the vestibular system. This system is what helps you keep your balance. BPPV causes a feeling of spinning. It is a common problem of the vestibular system.  Understanding the vestibular system  The vestibular system of the ear is made up of very tiny parts. They include the utricle, saccule, and " semicircular canals. The utricle is a tiny organ that contains calcium crystals. In some people, the crystals can move into the semicircular canals. When this happens, the system no longer works as it should. This causes BPPV. Benign means it is not life threatening. Paroxysmal means it happens suddenly. Positional means that it happens when you move your head. Vertigo is a feeling of spinning.  What causes BPPV?  Causes include injury to your head or neck. Other problems with the vestibular system may cause BPPV. In many people, the cause of BPPV is not known.  Symptoms of BPPV  You many have repeated feelings of spinning (vertigo). The vertigo usually lasts less than 1 minute. Some movements, such as rolling over in bed, can bring on vertigo.  Diagnosing BPPV  Your primary healthcare provider may diagnose and treat your BPPV. Or you may see an ear, nose, and throat doctor (otolaryngologist). In some cases, you may see a nervous system doctor (neurologist).  The healthcare provider will ask about your symptoms and your medical history. He or she will examine you. You may have hearing and balance tests. As part of the exam, your healthcare provider may have you move your head and body in certain ways. If you have BPPV, the movements can bring on vertigo. Your provider will also look for abnormal movements of your eyes. You may have other tests to check your vestibular or nervous systems.  Treatment for BPPV  Your healthcare provider may try to move the calcium crystals. This is done by having you move your head and neck in certain ways. This treatment is safe and often works well. You may also be told to do these movements at home. You may still have vertigo for a few weeks. Your healthcare provider will recheck your symptoms, usually in about a month. Special physical therapy may also be part of treatment. In rare cases, surgery may be needed for BPPV that does not go away.     When to call the healthcare  provider  Call your healthcare provider right away if you have any of these:  Symptoms that do not go away with treatment  Symptoms that get worse  New symptoms   Date Last Reviewed: 5/1/2017      Lindsey Olivas is a 49 year old, presenting for the following health issues:  Follow Up (ER [id number]-06/17, 8/21 and 8/22 for tachycardia.)        8/28/2024     1:15 PM   Additional Questions   Roomed by Christin DENISE   Deisy is a 49-year-old female with a past medical history significant for migraines who presents today to follow-up after being seen in the emergency department with paroxysmal SVT.  Patient was first seen in the emergency department for this concern on August 17 at which time she was experiencing a racing heart that she had noted via her Apple Watch.  She had not been diagnosed with paroxysmal SVT in the past, so this was quite surprising to her, but she also understands that she has been under quite a bit of stress and has not been taking care of herself well due to work, travel, and a number of other personal things.  It was recommended that she follow-up with cardiology and begin on metoprolol after her stress test began.  She was prescribed a medication, and did follow-up with cardiology with recommendation for a number of different cardiac tests, and she has been following through with these quite well.  She did follow-up in the emergency department on August 22, as she was having experiences with tachycardia again.  After evaluation, her examination was noted to be unremarkable, but she did attest to the fact that she had not started her prescribed metoprolol at that time, as she was waiting to initiate it until after she had completed her stress test.  She was sent home without any changes in her plan of care, and reports today with some ongoing anxieties and concerns.  Patient reports that her major concern today is worsened anxiety and related to her new health concerns.  She understands  that her increased anxiety is likely causing her heart rate to be more increased as well, but she is finding that she is having a very hard time managing her anxiety with more conservative techniques such as logical thinking, deep breathing, and meditation.  She has had diagnosed anxiety in the past, and was on a daily medication number of years ago for short period of time.  She notes that she has been largely anxiety free for a number of years without the need for as needed or daily medication at all, so this new onset worsened anxiety is quite surprising to her.  She declines any anxiety attacks, but does note that when she is appreciating her heart rate to be rising she does feel a bit more panicked.  She feels her heart pounding in her chest, and has a hard time calming herself back down, which has prompted her to call 911 a number of times.  Her workup via cardiology has been very reassuring, and she has now achieved full dosing of her metoprolol.  She takes 50 mg once daily, and declines any abnormal side effects with this such as heart rates below 50 bpm, fatigue, nausea, vomiting, feeling as though her heart is racing or skipping a beat, or headaches.  Her blood pressure is well-controlled, and she notes that her heart rates on this medication have been stable between 60-80.  She notes that occasionally her heart rate will get as high as 120, but this is about the highest it has gotten, and is only ever happens when she is feeling very anxious.  Once her anxiety is resolved, her heart rate also returns to normal.  She declines any chest pain, shortness of breath, or lightheadedness, and is not experiencing any neurological symptoms.  She does attest to some on going vertigo that is very intermittent.  She only experiences it on the left side, and generally only causes dizziness for a number of seconds before it resolves.    Review of Systems  Constitutional, HEENT, cardiovascular, pulmonary, gi and gu  "systems are negative, except as otherwise noted.      Objective    /66 (BP Location: Left arm, Patient Position: Sitting, Cuff Size: Adult Regular)   Pulse 87   Temp 98.4  F (36.9  C) (Tympanic)   Resp 14   Ht 1.702 m (5' 7.01\")   Wt 55.8 kg (123 lb)   LMP 08/21/2024 (Approximate)   SpO2 99%   BMI 19.26 kg/m    Body mass index is 19.26 kg/m .  Physical Exam   GENERAL: alert and no distress  EYES: Eyes grossly normal to inspection, PERRL and conjunctivae and sclerae normal  HENT: ear canals and TM's normal, nose and mouth without ulcers or lesions  NECK: no adenopathy, no asymmetry, masses, or scars  RESP: lungs clear to auscultation - no rales, rhonchi or wheezes  CV: regular rate and rhythm, normal S1 S2, no S3 or S4, no murmur, click or rub, no peripheral edema  ABDOMEN: soft, nontender, no hepatosplenomegaly, no masses and bowel sounds normal  MS: no gross musculoskeletal defects noted, no edema  NEURO: Normal strength and tone, mentation intact and speech normal  PSYCH: mentation appears normal, affect normal/bright    Ria Henderson DNP FNP-C  Family Nurse Practitioner - Same Day Provider  [facility] [place]        Signed Electronically by: CHRIS Tate CNP    "

## 2024-08-28 NOTE — PATIENT INSTRUCTIONS
"I think is a good idea to start you on an as needed anxiety medication.  This is a medication that will need to be taken every day, but is something that you can have on hand to manage your anxiety as needed.  This medication is called Atarax, and you can take 1 pill up to 3 times daily as needed.  This medication can cause some drowsiness, so please be aware of this when taking it, and avoid driving or doing activities that require you to be quite alert.  As we discussed, this medication is only prescribed to be taken as needed.  You can try other calming and anxiety management techniques before feeling as though you have to try this for your anxiety.  Please let me know if the side effects are intolerable to you, or if this is a medication that does not seem to be beneficial to you.    Seek immediate medical attention with symptoms including chest pain, shortness of breath, nausea and vomiting that prevents you from keeping food down, thunderclap headache, blurry or double vision, changes in hearing, confusion, or acute changes in mental status    I believe left sided vertigo is the cause of your symptoms.  To help with the symptoms, we need to help your body shift the crystals of the inner ear back to the proper position.  To do this, you need to try an exercise at home.  There is a maneuver called the \"half somersault\" that can be very effective in relieving the symptoms of vertigo.  The link for a video on how to perform this is below.  It may take several repetitions of of the maneuver.  https://youHousebites.be/[email or web address] or go to YouTube and search Noelle Fleming MD Vertigo treatment.    Please follow up with your primary care provide in one week for a recheck of your symptoms.  If you develop severe worsening of the symptoms, fevers, weakness, vomiting not controlled by medication, or any other concerns, please be seen in the ER right away.    Benign Paroxysmal Positional Vertigo     Your health care provider may " move your head in certain ways to treat your BPPV.     Benign paroxysmal positional vertigo (BPPV) is a problem with the inner ear. The inner ear contains the vestibular system. This system is what helps you keep your balance. BPPV causes a feeling of spinning. It is a common problem of the vestibular system.  Understanding the vestibular system  The vestibular system of the ear is made up of very tiny parts. They include the utricle, saccule, and semicircular canals. The utricle is a tiny organ that contains calcium crystals. In some people, the crystals can move into the semicircular canals. When this happens, the system no longer works as it should. This causes BPPV. Benign means it is not life threatening. Paroxysmal means it happens suddenly. Positional means that it happens when you move your head. Vertigo is a feeling of spinning.  What causes BPPV?  Causes include injury to your head or neck. Other problems with the vestibular system may cause BPPV. In many people, the cause of BPPV is not known.  Symptoms of BPPV  You many have repeated feelings of spinning (vertigo). The vertigo usually lasts less than 1 minute. Some movements, such as rolling over in bed, can bring on vertigo.  Diagnosing BPPV  Your primary healthcare provider may diagnose and treat your BPPV. Or you may see an ear, nose, and throat doctor (otolaryngologist). In some cases, you may see a nervous system doctor (neurologist).  The healthcare provider will ask about your symptoms and your medical history. He or she will examine you. You may have hearing and balance tests. As part of the exam, your healthcare provider may have you move your head and body in certain ways. If you have BPPV, the movements can bring on vertigo. Your provider will also look for abnormal movements of your eyes. You may have other tests to check your vestibular or nervous systems.  Treatment for BPPV  Your healthcare provider may try to move the calcium crystals.  This is done by having you move your head and neck in certain ways. This treatment is safe and often works well. You may also be told to do these movements at home. You may still have vertigo for a few weeks. Your healthcare provider will recheck your symptoms, usually in about a month. Special physical therapy may also be part of treatment. In rare cases, surgery may be needed for BPPV that does not go away.     When to call the healthcare provider  Call your healthcare provider right away if you have any of these:  Symptoms that do not go away with treatment  Symptoms that get worse  New symptoms   Date Last Reviewed: 5/1/2017

## 2024-12-17 ENCOUNTER — ANCILLARY PROCEDURE (OUTPATIENT)
Dept: MAMMOGRAPHY | Facility: CLINIC | Age: 49
End: 2024-12-17
Attending: FAMILY MEDICINE
Payer: COMMERCIAL

## 2024-12-17 DIAGNOSIS — Z12.31 VISIT FOR SCREENING MAMMOGRAM: ICD-10-CM

## 2024-12-17 PROCEDURE — 77063 BREAST TOMOSYNTHESIS BI: CPT | Performed by: STUDENT IN AN ORGANIZED HEALTH CARE EDUCATION/TRAINING PROGRAM

## 2024-12-17 PROCEDURE — 77067 SCR MAMMO BI INCL CAD: CPT | Performed by: STUDENT IN AN ORGANIZED HEALTH CARE EDUCATION/TRAINING PROGRAM

## 2024-12-26 ENCOUNTER — ANCILLARY PROCEDURE (OUTPATIENT)
Dept: MAMMOGRAPHY | Facility: CLINIC | Age: 49
End: 2024-12-26
Attending: FAMILY MEDICINE
Payer: COMMERCIAL

## 2024-12-26 DIAGNOSIS — R92.8 ABNORMAL MAMMOGRAM OF RIGHT BREAST: ICD-10-CM

## 2024-12-26 PROCEDURE — G0279 TOMOSYNTHESIS, MAMMO: HCPCS | Performed by: RADIOLOGY

## 2024-12-26 PROCEDURE — 77065 DX MAMMO INCL CAD UNI: CPT | Mod: RT | Performed by: RADIOLOGY

## 2025-03-02 ENCOUNTER — HEALTH MAINTENANCE LETTER (OUTPATIENT)
Age: 50
End: 2025-03-02